# Patient Record
Sex: FEMALE | Race: WHITE | NOT HISPANIC OR LATINO | Employment: FULL TIME | URBAN - METROPOLITAN AREA
[De-identification: names, ages, dates, MRNs, and addresses within clinical notes are randomized per-mention and may not be internally consistent; named-entity substitution may affect disease eponyms.]

---

## 2019-06-17 LAB
D AG BLD QL: POSITIVE
EXTERNAL ABO: NORMAL
EXTERNAL ANTIBODY SCREEN: NORMAL
HBV SURFACE AG SER QL: NONREACTIVE
HIV 1+2 AB+HIV1 P24 AG SERPL QL IA: NONREACTIVE
RUBELLA IGG SCREEN: NORMAL
T PALLIDUM AB SER QL IF: NONREACTIVE

## 2019-12-11 LAB — GP B STREP SPEC QL CULT: NORMAL

## 2020-01-17 ENCOUNTER — ANESTHESIA (INPATIENT)
Dept: OBSTETRICS AND GYNECOLOGY | Facility: HOSPITAL | Age: 25
End: 2020-01-17
Payer: COMMERCIAL

## 2020-01-17 ENCOUNTER — ANESTHESIA EVENT (INPATIENT)
Dept: OBSTETRICS AND GYNECOLOGY | Facility: HOSPITAL | Age: 25
End: 2020-01-17
Payer: COMMERCIAL

## 2020-01-17 ENCOUNTER — HOSPITAL ENCOUNTER (INPATIENT)
Facility: HOSPITAL | Age: 25
LOS: 2 days | Discharge: HOME | End: 2020-01-19
Attending: OBSTETRICS & GYNECOLOGY | Admitting: OBSTETRICS & GYNECOLOGY
Payer: COMMERCIAL

## 2020-01-17 PROBLEM — Z34.90 ENCOUNTER FOR PLANNED INDUCTION OF LABOR: Status: ACTIVE | Noted: 2020-01-17

## 2020-01-17 PROBLEM — Z34.90 ENCOUNTER FOR PLANNED INDUCTION OF LABOR: Status: RESOLVED | Noted: 2020-01-17 | Resolved: 2020-01-17

## 2020-01-17 LAB
ABO + RH BLD: NORMAL
BLD GP AB SCN SERPL QL: NEGATIVE
D AG BLD QL: POSITIVE
ERYTHROCYTE [DISTWIDTH] IN BLOOD BY AUTOMATED COUNT: 12.9 % (ref 11.7–14.4)
HCT VFR BLDCO AUTO: 39.5 %
HGB BLD-MCNC: 13 G/DL (ref 11.8–15.7)
LABORATORY COMMENT REPORT: NORMAL
MCH RBC QN AUTO: 29.6 PG (ref 28–33.2)
MCHC RBC AUTO-ENTMCNC: 32.9 G/DL (ref 32.2–35.5)
MCV RBC AUTO: 90 FL (ref 83–98)
PDW BLD AUTO: 11.6 FL (ref 9.4–12.3)
PLATELET # BLD AUTO: 177 K/UL
RBC # BLD AUTO: 4.39 M/UL (ref 3.93–5.22)
T PALLIDUM AB SER QL IF: NONREACTIVE
WBC # BLD AUTO: 13.28 K/UL

## 2020-01-17 PROCEDURE — 86901 BLOOD TYPING SEROLOGIC RH(D): CPT

## 2020-01-17 PROCEDURE — 36415 COLL VENOUS BLD VENIPUNCTURE: CPT | Performed by: OBSTETRICS & GYNECOLOGY

## 2020-01-17 PROCEDURE — 63700000 HC SELF-ADMINISTRABLE DRUG: Performed by: OBSTETRICS & GYNECOLOGY

## 2020-01-17 PROCEDURE — 0KQM0ZZ REPAIR PERINEUM MUSCLE, OPEN APPROACH: ICD-10-PCS | Performed by: OBSTETRICS & GYNECOLOGY

## 2020-01-17 PROCEDURE — 27200130 HC EPIDURAL ANES TRAY

## 2020-01-17 PROCEDURE — 12000000 HC ROOM AND CARE MED/SURG

## 2020-01-17 PROCEDURE — 72000011 HC VAGINAL DELIVERY LEVEL 1

## 2020-01-17 PROCEDURE — 25000000 HC PHARMACY GENERAL: Performed by: OBSTETRICS & GYNECOLOGY

## 2020-01-17 PROCEDURE — 37000005 HC ANESTHESIA EPIDURAL/SPINAL

## 2020-01-17 PROCEDURE — 25000000 HC PHARMACY GENERAL: Performed by: ANESTHESIOLOGY

## 2020-01-17 PROCEDURE — 85027 COMPLETE CBC AUTOMATED: CPT | Performed by: OBSTETRICS & GYNECOLOGY

## 2020-01-17 PROCEDURE — 86780 TREPONEMA PALLIDUM: CPT | Performed by: OBSTETRICS & GYNECOLOGY

## 2020-01-17 PROCEDURE — 63600000 HC DRUGS/DETAIL CODE: Performed by: OBSTETRICS & GYNECOLOGY

## 2020-01-17 RX ORDER — EPHEDRINE SULFATE/0.9% NACL/PF 50 MG/5 ML
10 SYRINGE (ML) INTRAVENOUS AS NEEDED
Status: DISCONTINUED | OUTPATIENT
Start: 2020-01-17 | End: 2020-01-17

## 2020-01-17 RX ORDER — METHYLERGONOVINE MALEATE 0.2 MG/ML
200 INJECTION INTRAVENOUS ONCE AS NEEDED
Status: DISCONTINUED | OUTPATIENT
Start: 2020-01-17 | End: 2020-01-19 | Stop reason: HOSPADM

## 2020-01-17 RX ORDER — OXYTOCIN/0.9 % SODIUM CHLORIDE 20/1000 ML
500 PLASTIC BAG, INJECTION (ML) INTRAVENOUS ONCE
Status: COMPLETED | OUTPATIENT
Start: 2020-01-17 | End: 2020-01-17

## 2020-01-17 RX ORDER — BUPIVACAINE HYDROCHLORIDE 2.5 MG/ML
INJECTION, SOLUTION EPIDURAL; INFILTRATION; INTRACAUDAL AS NEEDED
Status: DISCONTINUED | OUTPATIENT
Start: 2020-01-17 | End: 2020-01-17 | Stop reason: SURG

## 2020-01-17 RX ORDER — CALCIUM CARBONATE 200(500)MG
500 TABLET,CHEWABLE ORAL EVERY 4 HOURS PRN
Status: DISCONTINUED | OUTPATIENT
Start: 2020-01-17 | End: 2020-01-19 | Stop reason: HOSPADM

## 2020-01-17 RX ORDER — OXYTOCIN/0.9 % SODIUM CHLORIDE 30/500 ML
PLASTIC BAG, INJECTION (ML) INTRAVENOUS
Status: DISPENSED
Start: 2020-01-17 | End: 2020-01-17

## 2020-01-17 RX ORDER — OXYCODONE HYDROCHLORIDE 5 MG/1
5-10 TABLET ORAL EVERY 4 HOURS PRN
Status: DISCONTINUED | OUTPATIENT
Start: 2020-01-17 | End: 2020-01-19 | Stop reason: HOSPADM

## 2020-01-17 RX ORDER — LIDOCAINE HYDROCHLORIDE AND EPINEPHRINE 15; 5 MG/ML; UG/ML
INJECTION, SOLUTION EPIDURAL AS NEEDED
Status: DISCONTINUED | OUTPATIENT
Start: 2020-01-17 | End: 2020-01-17 | Stop reason: SURG

## 2020-01-17 RX ORDER — ALUMINUM HYDROXIDE, MAGNESIUM HYDROXIDE, AND SIMETHICONE 1200; 120; 1200 MG/30ML; MG/30ML; MG/30ML
30 SUSPENSION ORAL EVERY 4 HOURS PRN
Status: DISCONTINUED | OUTPATIENT
Start: 2020-01-17 | End: 2020-01-19 | Stop reason: HOSPADM

## 2020-01-17 RX ORDER — OXYTOCIN/0.9 % SODIUM CHLORIDE 20/1000 ML
PLASTIC BAG, INJECTION (ML) INTRAVENOUS
Status: DISPENSED
Start: 2020-01-17 | End: 2020-01-17

## 2020-01-17 RX ORDER — OXYTOCIN/0.9 % SODIUM CHLORIDE 20/1000 ML
PLASTIC BAG, INJECTION (ML) INTRAVENOUS CONTINUOUS
Status: DISCONTINUED | OUTPATIENT
Start: 2020-01-17 | End: 2020-01-17

## 2020-01-17 RX ORDER — IBUPROFEN 600 MG/1
600 TABLET ORAL EVERY 6 HOURS PRN
Status: DISCONTINUED | OUTPATIENT
Start: 2020-01-17 | End: 2020-01-19 | Stop reason: HOSPADM

## 2020-01-17 RX ORDER — METOCLOPRAMIDE HYDROCHLORIDE 5 MG/ML
10 INJECTION INTRAMUSCULAR; INTRAVENOUS EVERY 6 HOURS PRN
Status: DISCONTINUED | OUTPATIENT
Start: 2020-01-17 | End: 2020-01-19 | Stop reason: HOSPADM

## 2020-01-17 RX ORDER — AMOXICILLIN 250 MG
1 CAPSULE ORAL 2 TIMES DAILY
Status: DISCONTINUED | OUTPATIENT
Start: 2020-01-17 | End: 2020-01-19 | Stop reason: HOSPADM

## 2020-01-17 RX ORDER — SODIUM CHLORIDE, SODIUM LACTATE, POTASSIUM CHLORIDE, CALCIUM CHLORIDE 600; 310; 30; 20 MG/100ML; MG/100ML; MG/100ML; MG/100ML
125 INJECTION, SOLUTION INTRAVENOUS CONTINUOUS
Status: DISCONTINUED | OUTPATIENT
Start: 2020-01-17 | End: 2020-01-17

## 2020-01-17 RX ORDER — ACETAMINOPHEN 325 MG/1
650 TABLET ORAL EVERY 4 HOURS
Status: DISCONTINUED | OUTPATIENT
Start: 2020-01-17 | End: 2020-01-19 | Stop reason: HOSPADM

## 2020-01-17 RX ORDER — CARBOPROST TROMETHAMINE 250 UG/ML
250 INJECTION, SOLUTION INTRAMUSCULAR ONCE AS NEEDED
Status: DISCONTINUED | OUTPATIENT
Start: 2020-01-17 | End: 2020-01-19 | Stop reason: HOSPADM

## 2020-01-17 RX ORDER — DIPHENHYDRAMINE HCL 50 MG/ML
25 VIAL (ML) INJECTION EVERY 6 HOURS PRN
Status: DISCONTINUED | OUTPATIENT
Start: 2020-01-17 | End: 2020-01-19 | Stop reason: HOSPADM

## 2020-01-17 RX ORDER — ACETAMINOPHEN 325 MG/1
650 TABLET ORAL EVERY 4 HOURS PRN
Status: DISCONTINUED | OUTPATIENT
Start: 2020-01-17 | End: 2020-01-17 | Stop reason: SDUPTHER

## 2020-01-17 RX ORDER — TRANEXAMIC ACID 10 MG/ML
1000 INJECTION, SOLUTION INTRAVENOUS ONCE AS NEEDED
Status: DISCONTINUED | OUTPATIENT
Start: 2020-01-17 | End: 2020-01-19 | Stop reason: HOSPADM

## 2020-01-17 RX ORDER — MISOPROSTOL 200 UG/1
1000 TABLET ORAL ONCE AS NEEDED
Status: DISCONTINUED | OUTPATIENT
Start: 2020-01-17 | End: 2020-01-19 | Stop reason: HOSPADM

## 2020-01-17 RX ORDER — METOCLOPRAMIDE 10 MG/1
10 TABLET ORAL EVERY 6 HOURS PRN
Status: DISCONTINUED | OUTPATIENT
Start: 2020-01-17 | End: 2020-01-19 | Stop reason: HOSPADM

## 2020-01-17 RX ORDER — PNV,CALCIUM 72/IRON/FOLIC ACID 27 MG-1 MG
TABLET ORAL
COMMUNITY
Start: 2019-12-21 | End: 2020-01-19 | Stop reason: HOSPADM

## 2020-01-17 RX ORDER — OXYTOCIN 10 [USP'U]/ML
10 INJECTION, SOLUTION INTRAMUSCULAR; INTRAVENOUS ONCE AS NEEDED
Status: DISCONTINUED | OUTPATIENT
Start: 2020-01-17 | End: 2020-01-19 | Stop reason: HOSPADM

## 2020-01-17 RX ORDER — DIPHENHYDRAMINE HCL 25 MG
25 CAPSULE ORAL EVERY 6 HOURS PRN
Status: DISCONTINUED | OUTPATIENT
Start: 2020-01-17 | End: 2020-01-19 | Stop reason: HOSPADM

## 2020-01-17 RX ORDER — DIBUCAINE 1 %
1 OINTMENT (GRAM) TOPICAL AS NEEDED
Status: DISCONTINUED | OUTPATIENT
Start: 2020-01-17 | End: 2020-01-19 | Stop reason: HOSPADM

## 2020-01-17 RX ORDER — OXYTOCIN/0.9 % SODIUM CHLORIDE 30/500 ML
0-20 PLASTIC BAG, INJECTION (ML) INTRAVENOUS CONTINUOUS
Status: DISCONTINUED | OUTPATIENT
Start: 2020-01-17 | End: 2020-01-17

## 2020-01-17 RX ADMIN — Medication 50 ML: at 15:08

## 2020-01-17 RX ADMIN — Medication 10 MG: at 14:24

## 2020-01-17 RX ADMIN — Medication 50 ML: at 11:18

## 2020-01-17 RX ADMIN — IBUPROFEN 600 MG: 600 TABLET ORAL at 20:12

## 2020-01-17 RX ADMIN — BUPIVACAINE HYDROCHLORIDE 3 ML: 2.5 INJECTION, SOLUTION EPIDURAL; INFILTRATION; INTRACAUDAL at 11:35

## 2020-01-17 RX ADMIN — SODIUM CHLORIDE, POTASSIUM CHLORIDE, SODIUM LACTATE AND CALCIUM CHLORIDE 1000 ML: 600; 310; 30; 20 INJECTION, SOLUTION INTRAVENOUS at 08:10

## 2020-01-17 RX ADMIN — SENNOSIDES AND DOCUSATE SODIUM 1 TABLET: 8.6; 5 TABLET ORAL at 20:13

## 2020-01-17 RX ADMIN — OXYTOCIN-SODIUM CHLORIDE 0.9% IV SOLN 30 UNIT/500ML 2 MILLI-UNITS/MIN: 30-0.9/5 SOLUTION at 08:54

## 2020-01-17 RX ADMIN — Medication: at 16:42

## 2020-01-17 RX ADMIN — SODIUM CHLORIDE, POTASSIUM CHLORIDE, SODIUM LACTATE AND CALCIUM CHLORIDE 125 ML/HR: 600; 310; 30; 20 INJECTION, SOLUTION INTRAVENOUS at 11:15

## 2020-01-17 RX ADMIN — Medication 10 ML/HR: at 11:35

## 2020-01-17 RX ADMIN — LIDOCAINE HYDROCHLORIDE,EPINEPHRINE BITARTRATE 3 ML: 15; .005 INJECTION, SOLUTION EPIDURAL; INFILTRATION; INTRACAUDAL; PERINEURAL at 11:34

## 2020-01-17 SDOH — ECONOMIC STABILITY: FOOD INSECURITY: WITHIN THE PAST 12 MONTHS, YOU WORRIED THAT YOUR FOOD WOULD RUN OUT BEFORE YOU GOT THE MONEY TO BUY MORE.: NEVER TRUE

## 2020-01-17 SDOH — ECONOMIC STABILITY: FOOD INSECURITY: WITHIN THE PAST 12 MONTHS, THE FOOD YOU BOUGHT JUST DIDN'T LAST AND YOU DIDN'T HAVE MONEY TO GET MORE.: NEVER TRUE

## 2020-01-17 SDOH — HEALTH STABILITY: MENTAL HEALTH: HOW OFTEN DO YOU HAVE A DRINK CONTAINING ALCOHOL?: NEVER

## 2020-01-17 SDOH — ECONOMIC STABILITY: TRANSPORTATION INSECURITY: IN THE PAST 12 MONTHS, HAS LACK OF TRANSPORTATION KEPT YOU FROM MEDICAL APPOINTMENTS OR FROM GETTING MEDICATIONS?: NO

## 2020-01-17 SDOH — ECONOMIC STABILITY: FOOD INSECURITY: HOW HARD IS IT FOR YOU TO PAY FOR THE VERY BASICS LIKE FOOD, HOUSING, MEDICAL CARE, AND HEATING?: NOT HARD AT ALL

## 2020-01-17 ASSESSMENT — ACTIVITIES OF DAILY LIVING (ADL): LACK_OF_TRANSPORTATION: NO

## 2020-01-17 NOTE — ANESTHESIOLOGIST PRE-PROCEDURE ATTESTATION
Pre-Procedure Patient Identification:  I am the Primary Anesthesiologist and have identified the patient on 01/17/20 at 11:38 AM.   I have confirmed the following procedure(s) * No procedures listed * will be performed by the following surgeon/proceduralist * No surgeons listed *.

## 2020-01-17 NOTE — ANESTHESIA PROCEDURE NOTES
Epidural Block    Patient location during procedure: OB  Start time: 1/17/2020 11:25 AM  End time: 1/17/2020 11:35 AM  Reason for block: labor analgesia requested by patient and obstetrician  Staffing  Anesthesiologist: Rosalva Chisholm MD  Performed: anesthesiologist   Preanesthetic Checklist  Completed: patient identified, surgical consent, pre-op evaluation, timeout performed, IV checked, risks and benefits discussed, monitors and equipment checked and sterile field maintained during procedure  Epidural  Patient position: sitting  Prep: ChloraPrep and site prepped and draped  Patient monitoring: heart rate, cardiac monitor, continuous pulse ox and blood pressure  Approach: midline  Location: lumbar (1-5)  Injection technique: COURTNEY saline  Needle  Needle type: Aliosn   Needle gauge: 17 G  Needle length: 3.5 in  Needle insertion depth: 4.5 cm  Catheter type: Single orifice  Catheter size: 19 G  Catheter at skin depth: 10 cm  Test dose: negative and lidocaine 1.5% with epinephrine 1-to-200,000  Additional Notes  Procedure well tolerated. Vital signs stable. No paresthesia.  Analgesia satisfactory. Patients nurse to notify anesthesiologist if hemodynamically unstable.

## 2020-01-17 NOTE — ANESTHESIA PREPROCEDURE EVALUATION
"        ROS/Med Hx    Relevant Problems   No relevant active problems   borderline von willebrands --had ddavp for tonsils when \"levels were low\"  No bleeding diatheses during pregnancy  Obesity     Physical Exam    Airway   Mallampati: III  Cardiovascular - normal   Rhythm: regular   Rate: normalPulmonary - normal   clear to auscultation        Anesthesia Plan    Plan: labor epidural   ASA 2 - emergent  Anesthetic plan and risks discussed with: patient    "

## 2020-01-17 NOTE — PROGRESS NOTES
Pt is comfortable with epidural  Cervix 4/100/-2  FHR - cat. 1   Pit at 8  toco Q 2-3mins  Increase pitocin 1 mU/min Q 30 mins

## 2020-01-17 NOTE — H&P
HPI     Karen Sprague is a 24 y.o. female  at 41w0d with an estimated due date of 1/10/2020, by Patient Reported who presents with contractions overnight that have decreased in the last 2 hours.  Pt was to be induced today anyway.  Intact.      OB History:   OB History    Para Term  AB Living   1 0 0 0 0 0   SAB TAB Ectopic Multiple Live Births   0 0 0 0 0      # Outcome Date GA Lbr Jose De Jesus/2nd Weight Sex Delivery Anes PTL Lv   1 Current                Medical History:   Past Medical History:   Diagnosis Date   • Abnormal Pap smear of cervix    • Blood disorder    • Von Willebrands disease (CMS/HCC)        Surgical History:   Past Surgical History:   Procedure Laterality Date   • FOOT SURGERY     • TONSILLECTOMY     • WISDOM TOOTH EXTRACTION         Social History:   Social History     Socioeconomic History   • Marital status: Single     Spouse name: Jcarlos   • Number of children: None   • Years of education: None   • Highest education level: None   Occupational History   • Occupation: [employer]   Social Needs   • Financial resource strain: Not hard at all   • Food insecurity:     Worry: Never true     Inability: Never true   • Transportation needs:     Medical: No     Non-medical: No   Tobacco Use   • Smoking status: Never Smoker   • Smokeless tobacco: Never Used   Substance and Sexual Activity   • Alcohol use: Never     Frequency: Never   • Drug use: Never   • Sexual activity: Yes     Partners: Male   Lifestyle   • Physical activity:     Days per week: None     Minutes per session: None   • Stress: None   Relationships   • Social connections:     Talks on phone: None     Gets together: None     Attends Synagogue service: None     Active member of club or organization: None     Attends meetings of clubs or organizations: None     Relationship status: None   • Intimate partner violence:     Fear of current or ex partner: None     Emotionally abused: None     Physically abused: None     Forced  sexual activity: None   Other Topics Concern   • None   Social History Narrative   • None        Family History:   Family History   Problem Relation Age of Onset   • Stroke Maternal Grandmother    • Cancer Maternal Grandfather    • Cancer Biological Mother    • Hypertension Biological Mother        Allergies: Patient has no known allergies.    Prior to Admission medications    Medication Sig Start Date End Date Taking? Authorizing Provider   PREPLUS 27 mg iron- 1 mg tablet TK 1 T PO QD 19  Yes Provider, Dayana, MD       Review of Systems  Pertinent items are noted in HPI.    Objective     Vital Signs for the last 24 hours:  Heart Rate:  [110] 110  BP: (114)/(74) 114/74    Latest cervical exam:  Cervical Dilation (cm): 3  Cervical Effacement: 90     Method: sterile exam per physician (20 0825)      Fetal Monitoring:  FHR Baseline: 130  FHR Variability: mod  FHR Accelerations: yes  FHR Decelerations: no    Contraction Frequency: Q4-5 mins    Exam:  General Appearance: Alert, cooperative, no acute distress  Lungs:  respirations unlabored  Heart: Regular rate   Abdomen: gravid, nontender  Genitalia: See vaginal exam  Extremities: no edema or calf tenderness  Neurologic: grossly intact without focal deficits        Labs:  No results found for: ABO, LABRH, RUBELLAIGGQT, GBS-pending    Assessment/Plan     Karen Sprague is a 24 y.o. female  at 41w0d admitted for induction of labor     FHR: Category I  GBS: negative   AROM - clear    Paulette Mascorro MD

## 2020-01-18 PROCEDURE — 63700000 HC SELF-ADMINISTRABLE DRUG: Performed by: OBSTETRICS & GYNECOLOGY

## 2020-01-18 PROCEDURE — 12000000 HC ROOM AND CARE MED/SURG

## 2020-01-18 RX ADMIN — ACETAMINOPHEN 650 MG: 325 TABLET, FILM COATED ORAL at 05:23

## 2020-01-18 RX ADMIN — SENNOSIDES AND DOCUSATE SODIUM 1 TABLET: 8.6; 5 TABLET ORAL at 20:59

## 2020-01-18 RX ADMIN — IBUPROFEN 600 MG: 600 TABLET ORAL at 16:22

## 2020-01-18 RX ADMIN — ACETAMINOPHEN 650 MG: 325 TABLET, FILM COATED ORAL at 09:42

## 2020-01-18 RX ADMIN — PRENATAL VITAMINS-IRON FUMARATE 27 MG IRON-FOLIC ACID 0.8 MG TABLET 1 TABLET: at 09:42

## 2020-01-18 RX ADMIN — SENNOSIDES AND DOCUSATE SODIUM 1 TABLET: 8.6; 5 TABLET ORAL at 09:42

## 2020-01-18 RX ADMIN — IBUPROFEN 600 MG: 600 TABLET ORAL at 09:42

## 2020-01-18 RX ADMIN — ACETAMINOPHEN 650 MG: 325 TABLET, FILM COATED ORAL at 13:23

## 2020-01-18 RX ADMIN — IBUPROFEN 600 MG: 600 TABLET ORAL at 02:09

## 2020-01-18 RX ADMIN — IBUPROFEN 600 MG: 600 TABLET ORAL at 23:11

## 2020-01-18 NOTE — PLAN OF CARE
Problem: Adult Inpatient Plan of Care  Goal: Plan of Care Review  Outcome: Progressing  Flowsheets (Taken 1/18/2020 6949)  Progress: improving  Plan of Care Reviewed With: patient; spouse  Outcome Summary: pt states pain well-controlled w/ PO meds. bottlefeeding exclusively - wearing bra. assuming total care of infant.

## 2020-01-18 NOTE — PLAN OF CARE
Problem: Adult Inpatient Plan of Care  Goal: Plan of Care Review  1/18/2020 0531 by Margret Armando, RN  Outcome: Progressing  Flowsheets  Taken 1/18/2020 0531  Progress: improving  Outcome Summary: Pt recovering well from first vaginal delivery. Pt taking tylenol and ibuprofen for pain. Pt very independent with her self care.  Taken 1/18/2020 0000  Plan of Care Reviewed With: patient;significant other

## 2020-01-18 NOTE — NURSING NOTE
Pt getting in shower now. Offers no complaints/needs. States pain well-controlled at this time. RN to return in 30 minutes for a.m. Assessment and med administration. Pt v/u; agreeable with plan of care. Infant asleep in open crib.    Griselda Eli, RN

## 2020-01-18 NOTE — PLAN OF CARE
Problem: Adult Inpatient Plan of Care  Goal: Patient-Specific Goal (Individualization)  Outcome: Progressing  Flowsheets (Taken 1/18/2020 0528)  Individualized Care Needs: bottle feeding instructions welcome.  Anxieties, Fears or Concerns: first time parents. they are very concerned with caring for their new baby. They appreciate all teaching.

## 2020-01-18 NOTE — NURSING NOTE
Pt and SO sleeping, undisturbed by RN activity at bedside. Assessment deferred while pt rests. Infant asleep in open crib at bedside.    Girselda Eli RN

## 2020-01-18 NOTE — PROGRESS NOTES
Obstetrics Postpartum Progress Note    Events  No acute events overnight.    Subjective  Pain: Controlled with medications  Bleeding: lochia moderate  Voiding: without difficulty  Ambulating: as tolerated    Vitals  Temp:  [36.4 °C (97.5 °F)-37 °C (98.6 °F)] 36.4 °C (97.5 °F)  Heart Rate:  [] 82  Resp:  [14-18] 14  BP: ()/(50-91) 110/57    I&O    Intake/Output Summary (Last 24 hours) at 2020 0944  Last data filed at 2020 1948  Gross per 24 hour   Intake 1000 ml   Output 1260 ml   Net -260 ml       Physical Exam  General: well, NAD  Abdomen: soft, NT  Fundus: firm and nontender  Extremities: NT x2, symmetric    Labs  Labs Reviewed:  Lab Results   Component Value Date    ABO B 2020    LABRH Positive 2020          Assessment/Plan   Problem-based Assessment and Plan    Karen Sprague is a 24 y.o.  postpartum day 1 s/p Vaginal, Spontaneous .      Appropriate postpartum course. Continue routine postpartum care.  Anticipate discharge on PPD#2      Jany Barry MD

## 2020-01-18 NOTE — ANESTHESIA POSTPROCEDURE EVALUATION
Patient: Karen Sprague    Procedure Summary     Date:  01/17/20 Room / Location:      Anesthesia Start:  1125 Anesthesia Stop:  1624    Procedure:  Labor Analgesia Diagnosis:      Scheduled Providers:   Responsible Provider:  Jcarlos Littlejohn MD    Anesthesia Type:  labor epidural ASA Status:  2 - Emergent          Anesthesia Type: labor epidural  PACU Vitals     No data found in the last 10 encounters.            Anesthesia Post Evaluation    Pain management: adequate  Patient participation: complete - patient participated  Level of consciousness: awake and alert  Cardiovascular status: acceptable  Airway Patency: adequate  Respiratory status: acceptable  Hydration status: acceptable  Pain well controlled after spinal preservative free morphine administration: Yes  Continue 24 hours observation after preservative free morphine administration: No  Anesthetic complications: no

## 2020-01-19 VITALS
WEIGHT: 192 LBS | TEMPERATURE: 97.5 F | RESPIRATION RATE: 18 BRPM | HEIGHT: 66 IN | SYSTOLIC BLOOD PRESSURE: 142 MMHG | HEART RATE: 82 BPM | DIASTOLIC BLOOD PRESSURE: 81 MMHG | OXYGEN SATURATION: 99 % | BODY MASS INDEX: 30.86 KG/M2

## 2020-01-19 PROCEDURE — 63700000 HC SELF-ADMINISTRABLE DRUG: Performed by: OBSTETRICS & GYNECOLOGY

## 2020-01-19 RX ADMIN — SENNOSIDES AND DOCUSATE SODIUM 1 TABLET: 8.6; 5 TABLET ORAL at 09:08

## 2020-01-19 RX ADMIN — PRENATAL VITAMINS-IRON FUMARATE 27 MG IRON-FOLIC ACID 0.8 MG TABLET 1 TABLET: at 09:08

## 2020-01-19 RX ADMIN — IBUPROFEN 600 MG: 600 TABLET ORAL at 05:24

## 2020-01-19 NOTE — PLAN OF CARE
Problem: Adult Inpatient Plan of Care  Goal: Plan of Care Review  Outcome: Progressing  Flowsheets (Taken 1/19/2020 0535)  Progress: improving  Plan of Care Reviewed With: patient; significant other  Outcome Summary: pain controlled, bottle feeding-bra on, bleeding controlled, VSS

## 2021-02-12 ENCOUNTER — APPOINTMENT (RX ONLY)
Dept: URBAN - METROPOLITAN AREA CLINIC 374 | Facility: CLINIC | Age: 26
Setting detail: DERMATOLOGY
End: 2021-02-12

## 2021-02-12 DIAGNOSIS — D18.0 HEMANGIOMA: ICD-10-CM

## 2021-02-12 DIAGNOSIS — L91.8 OTHER HYPERTROPHIC DISORDERS OF THE SKIN: ICD-10-CM

## 2021-02-12 DIAGNOSIS — D22 MELANOCYTIC NEVI: ICD-10-CM

## 2021-02-12 DIAGNOSIS — Z71.89 OTHER SPECIFIED COUNSELING: ICD-10-CM

## 2021-02-12 DIAGNOSIS — L81.4 OTHER MELANIN HYPERPIGMENTATION: ICD-10-CM

## 2021-02-12 DIAGNOSIS — L70.0 ACNE VULGARIS: ICD-10-CM

## 2021-02-12 PROBLEM — D22.71 MELANOCYTIC NEVI OF RIGHT LOWER LIMB, INCLUDING HIP: Status: ACTIVE | Noted: 2021-02-12

## 2021-02-12 PROBLEM — D22.61 MELANOCYTIC NEVI OF RIGHT UPPER LIMB, INCLUDING SHOULDER: Status: ACTIVE | Noted: 2021-02-12

## 2021-02-12 PROBLEM — D22.72 MELANOCYTIC NEVI OF LEFT LOWER LIMB, INCLUDING HIP: Status: ACTIVE | Noted: 2021-02-12

## 2021-02-12 PROBLEM — D22.62 MELANOCYTIC NEVI OF LEFT UPPER LIMB, INCLUDING SHOULDER: Status: ACTIVE | Noted: 2021-02-12

## 2021-02-12 PROBLEM — D18.01 HEMANGIOMA OF SKIN AND SUBCUTANEOUS TISSUE: Status: ACTIVE | Noted: 2021-02-12

## 2021-02-12 PROBLEM — D22.5 MELANOCYTIC NEVI OF TRUNK: Status: ACTIVE | Noted: 2021-02-12

## 2021-02-12 PROCEDURE — ? FULL BODY SKIN EXAM

## 2021-02-12 PROCEDURE — ? PRESCRIPTION

## 2021-02-12 PROCEDURE — ? BENIGN DESTRUCTION

## 2021-02-12 PROCEDURE — ? COUNSELING

## 2021-02-12 PROCEDURE — 99203 OFFICE O/P NEW LOW 30 MIN: CPT

## 2021-02-12 PROCEDURE — ? SUNSCREEN RECOMMENDATIONS

## 2021-02-12 PROCEDURE — ? TOPICAL RETINOID COUNSELING

## 2021-02-12 PROCEDURE — ? PRESCRIPTION MEDICATION MANAGEMENT

## 2021-02-12 RX ORDER — ADAPALENE 3 MG/G
GEL TOPICAL QHS
Qty: 1 | Refills: 3 | Status: ERX | COMMUNITY
Start: 2021-02-12

## 2021-02-12 RX ADMIN — ADAPALENE: 3 GEL TOPICAL at 00:00

## 2021-02-12 ASSESSMENT — LOCATION SIMPLE DESCRIPTION DERM
LOCATION SIMPLE: RIGHT UPPER BACK
LOCATION SIMPLE: RIGHT ANTERIOR NECK
LOCATION SIMPLE: LEFT PRETIBIAL REGION
LOCATION SIMPLE: UPPER BACK
LOCATION SIMPLE: LEFT CHEEK
LOCATION SIMPLE: LEFT POSTERIOR UPPER ARM
LOCATION SIMPLE: ABDOMEN
LOCATION SIMPLE: RIGHT POSTERIOR UPPER ARM
LOCATION SIMPLE: RIGHT PRETIBIAL REGION

## 2021-02-12 ASSESSMENT — LOCATION DETAILED DESCRIPTION DERM
LOCATION DETAILED: RIGHT SUPERIOR MEDIAL UPPER BACK
LOCATION DETAILED: EPIGASTRIC SKIN
LOCATION DETAILED: RIGHT PROXIMAL PRETIBIAL REGION
LOCATION DETAILED: LEFT INFERIOR MEDIAL MALAR CHEEK
LOCATION DETAILED: RIGHT INFERIOR ANTERIOR NECK
LOCATION DETAILED: LEFT PROXIMAL POSTERIOR UPPER ARM
LOCATION DETAILED: RIGHT PROXIMAL POSTERIOR UPPER ARM
LOCATION DETAILED: SUPERIOR THORACIC SPINE
LOCATION DETAILED: LEFT PROXIMAL PRETIBIAL REGION

## 2021-02-12 ASSESSMENT — LOCATION ZONE DERM
LOCATION ZONE: NECK
LOCATION ZONE: FACE
LOCATION ZONE: LEG
LOCATION ZONE: ARM
LOCATION ZONE: TRUNK

## 2021-02-12 NOTE — PROCEDURE: PRESCRIPTION MEDICATION MANAGEMENT
Render In Strict Bullet Format?: No
Detail Level: Zone
Initiate Treatment: adapalene 0.3 % topical gel Qhs: Apply a pea size amount qhs to face for acne

## 2021-02-12 NOTE — PROCEDURE: COUNSELING
Detail Level: Detailed
Topical Sulfur Applications Counseling: Topical Sulfur Counseling: Patient counseled that this medication may cause skin irritation or allergic reactions.  In the event of skin irritation, the patient was advised to reduce the amount of the drug applied or use it less frequently.   The patient verbalized understanding of the proper use and possible adverse effects of topical sulfur application.  All of the patient's questions and concerns were addressed.
Erythromycin Pregnancy And Lactation Text: This medication is Pregnancy Category B and is considered safe during pregnancy. It is also excreted in breast milk.
Tazorac Counseling:  Patient advised that medication is irritating and drying.  Patient may need to apply sparingly and wash off after an hour before eventually leaving it on overnight.  The patient verbalized understanding of the proper use and possible adverse effects of tazorac.  All of the patient's questions and concerns were addressed.
Doxycycline Counseling:  Patient counseled regarding possible photosensitivity and increased risk for sunburn.  Patient instructed to avoid sunlight, if possible.  When exposed to sunlight, patients should wear protective clothing, sunglasses, and sunscreen.  The patient was instructed to call the office immediately if the following severe adverse effects occur:  hearing changes, easy bruising/bleeding, severe headache, or vision changes.  The patient verbalized understanding of the proper use and possible adverse effects of doxycycline.  All of the patient's questions and concerns were addressed.
Azithromycin Pregnancy And Lactation Text: This medication is considered safe during pregnancy and is also secreted in breast milk.
Sarecycline Counseling: Patient advised regarding possible photosensitivity and discoloration of the teeth, skin, lips, tongue and gums.  Patient instructed to avoid sunlight, if possible.  When exposed to sunlight, patients should wear protective clothing, sunglasses, and sunscreen.  The patient was instructed to call the office immediately if the following severe adverse effects occur:  hearing changes, easy bruising/bleeding, severe headache, or vision changes.  The patient verbalized understanding of the proper use and possible adverse effects of sarecycline.  All of the patient's questions and concerns were addressed.
Benzoyl Peroxide Pregnancy And Lactation Text: This medication is Pregnancy Category C. It is unknown if benzoyl peroxide is excreted in breast milk.
Topical Retinoid counseling:  Patient advised to apply a pea-sized amount only at bedtime and wait 30 minutes after washing their face before applying.  If too drying, patient may add a non-comedogenic moisturizer. The patient verbalized understanding of the proper use and possible adverse effects of retinoids.  All of the patient's questions and concerns were addressed.
Isotretinoin Counseling: Patient should get monthly blood tests, not donate blood, not drive at night if vision affected, not share medication, and not undergo elective surgery for 6 months after tx completed. Side effects reviewed, pt to contact office should one occur.
Topical Sulfur Applications Pregnancy And Lactation Text: This medication is Pregnancy Category C and has an unknown safety profile during pregnancy. It is unknown if this topical medication is excreted in breast milk.
Birth Control Pills Pregnancy And Lactation Text: This medication should be avoided if pregnant and for the first 30 days post-partum.
High Dose Vitamin A Pregnancy And Lactation Text: High dose vitamin A therapy is contraindicated during pregnancy and breast feeding.
Bactrim Counseling:  I discussed with the patient the risks of sulfa antibiotics including but not limited to GI upset, allergic reaction, drug rash, diarrhea, dizziness, photosensitivity, and yeast infections.  Rarely, more serious reactions can occur including but not limited to aplastic anemia, agranulocytosis, methemoglobinemia, blood dyscrasias, liver or kidney failure, lung infiltrates or desquamative/blistering drug rashes.
Tetracycline Counseling: Patient counseled regarding possible photosensitivity and increased risk for sunburn.  Patient instructed to avoid sunlight, if possible.  When exposed to sunlight, patients should wear protective clothing, sunglasses, and sunscreen.  The patient was instructed to call the office immediately if the following severe adverse effects occur:  hearing changes, easy bruising/bleeding, severe headache, or vision changes.  The patient verbalized understanding of the proper use and possible adverse effects of tetracycline.  All of the patient's questions and concerns were addressed. Patient understands to avoid pregnancy while on therapy due to potential birth defects.
Use Enhanced Medication Counseling?: No
Tazorac Pregnancy And Lactation Text: This medication is not safe during pregnancy. It is unknown if this medication is excreted in breast milk.
Sarecycline Pregnancy And Lactation Text: This medication is Pregnancy Category D and not consider safe during pregnancy. It is also excreted in breast milk.
Dapsone Counseling: I discussed with the patient the risks of dapsone including but not limited to hemolytic anemia, agranulocytosis, rashes, methemoglobinemia, kidney failure, peripheral neuropathy, headaches, GI upset, and liver toxicity.  Patients who start dapsone require monitoring including baseline LFTs and weekly CBCs for the first month, then every month thereafter.  The patient verbalized understanding of the proper use and possible adverse effects of dapsone.  All of the patient's questions and concerns were addressed.
Spironolactone Counseling: Patient advised regarding risks of diarrhea, abdominal pain, hyperkalemia, birth defects (for female patients), liver toxicity and renal toxicity. The patient may need blood work to monitor liver and kidney function and potassium levels while on therapy. The patient verbalized understanding of the proper use and possible adverse effects of spironolactone.  All of the patient's questions and concerns were addressed.
Topical Retinoid Pregnancy And Lactation Text: This medication is Pregnancy Category C. It is unknown if this medication is excreted in breast milk.
Doxycycline Pregnancy And Lactation Text: This medication is Pregnancy Category D and not consider safe during pregnancy. It is also excreted in breast milk but is considered safe for shorter treatment courses.
Topical Clindamycin Counseling: Patient counseled that this medication may cause skin irritation or allergic reactions.  In the event of skin irritation, the patient was advised to reduce the amount of the drug applied or use it less frequently.   The patient verbalized understanding of the proper use and possible adverse effects of clindamycin.  All of the patient's questions and concerns were addressed.
Minocycline Counseling: Patient advised regarding possible photosensitivity and discoloration of the teeth, skin, lips, tongue and gums.  Patient instructed to avoid sunlight, if possible.  When exposed to sunlight, patients should wear protective clothing, sunglasses, and sunscreen.  The patient was instructed to call the office immediately if the following severe adverse effects occur:  hearing changes, easy bruising/bleeding, severe headache, or vision changes.  The patient verbalized understanding of the proper use and possible adverse effects of minocycline.  All of the patient's questions and concerns were addressed.
Isotretinoin Pregnancy And Lactation Text: This medication is Pregnancy Category X and is considered extremely dangerous during pregnancy. It is unknown if it is excreted in breast milk.
Erythromycin Counseling:  I discussed with the patient the risks of erythromycin including but not limited to GI upset, allergic reaction, drug rash, diarrhea, increase in liver enzymes, and yeast infections.
Bactrim Pregnancy And Lactation Text: This medication is Pregnancy Category D and is known to cause fetal risk.  It is also excreted in breast milk.
Spironolactone Pregnancy And Lactation Text: This medication can cause feminization of the male fetus and should be avoided during pregnancy. The active metabolite is also found in breast milk.
Birth Control Pills Counseling: Birth Control Pill Counseling: I discussed with the patient the potential side effects of OCPs including but not limited to increased risk of stroke, heart attack, thrombophlebitis, deep venous thrombosis, hepatic adenomas, breast changes, GI upset, headaches, and depression.  The patient verbalized understanding of the proper use and possible adverse effects of OCPs. All of the patient's questions and concerns were addressed.
Benzoyl Peroxide Counseling: Patient counseled that medicine may cause skin irritation and bleach clothing.  In the event of skin irritation, the patient was advised to reduce the amount of the drug applied or use it less frequently.   The patient verbalized understanding of the proper use and possible adverse effects of benzoyl peroxide.  All of the patient's questions and concerns were addressed.
High Dose Vitamin A Counseling: Side effects reviewed, pt to contact office should one occur.
Topical Clindamycin Pregnancy And Lactation Text: This medication is Pregnancy Category B and is considered safe during pregnancy. It is unknown if it is excreted in breast milk.
Dapsone Pregnancy And Lactation Text: This medication is Pregnancy Category C and is not considered safe during pregnancy or breast feeding.
Azithromycin Counseling:  I discussed with the patient the risks of azithromycin including but not limited to GI upset, allergic reaction, drug rash, diarrhea, and yeast infections.

## 2021-02-12 NOTE — PROCEDURE: BENIGN DESTRUCTION
Detail Level: Detailed
Add 52 Modifier (Optional): no
Consent: The patient's consent was obtained including but not limited to risks of crusting, scabbing, blistering, scarring, darker or lighter pigmentary change, recurrence, incomplete removal and infection.
Medical Necessity Clause: This procedure was medically necessary because the lesions that were treated were:
Post-Care Instructions: I reviewed with the patient in detail post-care instructions. Patient is to wear sunprotection, and avoid picking at any of the treated lesions. Pt may apply Vaseline to crusted or scabbing areas.
Medical Necessity Information: It is in your best interest to select a reason for this procedure from the list below. All of these items fulfill various CMS LCD requirements except the new and changing color options.
Anesthesia Volume In Cc: 0.5

## 2023-05-05 ENCOUNTER — TRANSCRIBE ORDERS (OUTPATIENT)
Dept: SCHEDULING | Age: 28
End: 2023-05-05

## 2023-05-05 DIAGNOSIS — Z36.87 ENCOUNTER FOR ANTENATAL SCREENING FOR UNCERTAIN DATES: Primary | ICD-10-CM

## 2023-05-05 LAB
HBV SURFACE AG SER QL: NONREACTIVE
HIV 1+2 AB+HIV1 P24 AG SERPL QL IA: NONREACTIVE

## 2023-05-10 ENCOUNTER — HOSPITAL ENCOUNTER (OUTPATIENT)
Dept: RADIOLOGY | Facility: CLINIC | Age: 28
Discharge: HOME | End: 2023-05-10
Attending: OBSTETRICS & GYNECOLOGY
Payer: COMMERCIAL

## 2023-05-10 ENCOUNTER — HOSPITAL ENCOUNTER (OUTPATIENT)
Dept: PERINATAL CARE | Facility: HOSPITAL | Age: 28
Discharge: HOME | End: 2023-05-10
Attending: OBSTETRICS & GYNECOLOGY
Payer: COMMERCIAL

## 2023-05-10 DIAGNOSIS — Z36.3 ANTENATAL SCREENING FOR MALFORMATION USING ULTRASONICS: ICD-10-CM

## 2023-05-10 DIAGNOSIS — O36.80X0 ENCOUNTER TO DETERMINE FETAL VIABILITY OF PREGNANCY, SINGLE OR UNSPECIFIED FETUS: Primary | ICD-10-CM

## 2023-05-10 DIAGNOSIS — Z36.87 ENCOUNTER FOR ANTENATAL SCREENING FOR UNCERTAIN DATES: ICD-10-CM

## 2023-05-10 DIAGNOSIS — Z3A.09 9 WEEKS GESTATION OF PREGNANCY: ICD-10-CM

## 2023-05-10 PROCEDURE — 76801 OB US < 14 WKS SINGLE FETUS: CPT

## 2023-10-19 ENCOUNTER — TRANSCRIBE ORDERS (OUTPATIENT)
Dept: SCHEDULING | Age: 28
End: 2023-10-19

## 2023-10-19 DIAGNOSIS — O24.419 GESTATIONAL DIABETES MELLITUS IN PREGNANCY, UNSPECIFIED CONTROL: Primary | ICD-10-CM

## 2023-10-20 ENCOUNTER — TELEMEDICINE (OUTPATIENT)
Dept: NUTRITION | Facility: HOSPITAL | Age: 28
End: 2023-10-20
Attending: OBSTETRICS & GYNECOLOGY
Payer: COMMERCIAL

## 2023-10-20 DIAGNOSIS — O24.410 DIET CONTROLLED GESTATIONAL DIABETES MELLITUS (GDM) IN THIRD TRIMESTER: Primary | ICD-10-CM

## 2023-10-20 PROCEDURE — G0108 DIAB MANAGE TRN  PER INDIV: HCPCS

## 2023-10-20 RX ORDER — FERROUS SULFATE 137(45) MG
TABLET, EXTENDED RELEASE ORAL
COMMUNITY
Start: 2023-09-22 | End: 2023-12-14 | Stop reason: HOSPADM

## 2023-10-20 NOTE — PROGRESS NOTES
Verification of Patient Location:  The patient affirms they are currently located in the following state: [place]    Request for Consent:  Audio and Video Encounter   Berna, my name is Nereyda Pereira.  Before we proceed, can you please verify your identification by telling me your full name and date of birth?  Can you tell me who is in the room with you?    You and I are about to have a telemedicine check-in or visit because you have requested it.  This is a live video-conference.  I am a real person, speaking to you in real time.  There is no one else with me on the video-conference.  However, when we use ([employer], [employer], etc) it is important for you to know that the video-conference may not be secure or private.  I am not recording this conversation and I am asking you not to record it.  This telemedicine visit will be billed to your health insurance or you, if you are self-insured.  You understand you will be responsible for any copayments or coinsurances that apply to your telemedicine visit.  Communication platform used for this encounter:  [employer] Video Visit (Epic Video Client)    Before starting our telemedicine visit, I am required to get your consent for this virtual check-in or visit by telemedicine. Do you consent?    Patient Response to Request for Consent:  Yes      Visit Documentation:  Subjective     Patient ID: Karen Sprague is a 27 y.o. female.  1995      HPI    The following have been reviewed and updated as appropriate in this visit:   Allergies  Meds  Problems       Review of Systems      Assessment/Plan   Diagnoses and all orders for this visit:    Diet controlled gestational diabetes mellitus (GDM) in third trimester (Primary)        Time Spent:  I spent 65 min  minutes on this date of service performing the following activities: providing counseling and education.  Diabetes Management Program    Subjective      Patient ID: Ms. Karen Sprague is a 27 y.o. female who has been  referred for diabetes assessment.      Comments: Patient educated on carbohydrate counting, meal spacing, label reading, food safety guidelines during pregnancy, sleep, exercise, fluid, protein, fiber, and fat. Patient verbalized understanding. Education material sent electronically.     Estimated Nutrition Needs: (calculated by RD)              Energy:2468 kcal/day (during pregnancy)              Carbohydrates:  216 grams a day (35% from CHO)  Protein:  - 107-154 g/day   Fiber: 25-30 g/day              Water: 80-96 oz/day     Nutrition Diagnosis:              P - Altered nutrition-related laboratory values (elevated blood sugars) related to              E - Gestational diabetes as evidenced by              S - Failed OGTT     Recommendation/Care Plan:  __ Continue current nutrition Plan  _x_ Change Diet: calculated energy needs, no fruit juice, no fruit or milk until lunch, meals/snacks 3-4 hours apart, no more than 8-10 hour overnight fast, always protein with CHO.         _x_ Encourage activity  __ Add snacks:   __ Add supplements:    Diabetes Medication/Instruction: Reviewed with patient.   Current Outpatient Medications:     ferrous sulfate (SLOW FE) 137 mg (45 mg iron) tablet extended release tablet, extended release, , Disp: , Rfl:     magnesium, , Disp: , Rfl:     prenatal vit no.130-iron-folic 27 mg iron- 800 mcg tablet tablet, Take 1 tablet by mouth daily., Disp: , Rfl:     Provided education on definition, diagnostic criteria of gestational diabetes (GDM)/diabetes (DM) during pregnancy and potential maternal fetal risk of complications.                                                                            Instructed on signs, symptoms, safety precautions of hyperglycemia, hypoglycemia with treatment rule of 15.                                                                                                                                               Reinforced individualized medication  management.                             Instructed on monitoring blood glucose equipment, frequency of testing, blood glucose goals for control, when and where to follow up with results and when to follow up if out of range.                Counseled importance of follow up care both prenatally and after delivery to assess glucose tolerance.    SBGM:  Meter Contour Next Gen   using    Schedule:  7 days per week     4 Tests per day fasting and 2hrs. pp  Comments: Already checking FBS today 79 mg/dL pp results yesterday all <112 mg/dL -was testing at 1 hour pp will change to 2 hour pp.      Plan: Instructed to follow-up with the [facility] for continued monitoring of blood sugar for the duration of the pregnancy.   Goals      Follow your diet plan as advised by your nutritionist      Record your blood sugar as directed          Barriers:  No identified barriers    Plans to overcome barriers: Written materials    Teaching directed to:patient      Nereyda Pereira RN BSN [facility]

## 2023-10-20 NOTE — LETTER
October 20, 2023     Tequila Rasmussen MD  [address] [place], [place] [address] [place] [address]    Patient: Karen Sprague   YOB: 1995   Date of Visit: 10/20/2023       Dear Dr. Rasmussen:    Thank you for referring Karen Sprague to me for evaluation. Below are my notes for this consultation.    If you have questions, please do not hesitate to call me. I look forward to following your patient along with you.         Sincerely,        Nereyda Pereira        CC: No Recipients      Verification of Patient Location:  The patient affirms they are currently located in the following state: [place]    Request for Consent:  Audio and Video Encounter   Berna, my name is Nereyda Pereira.  Before we proceed, can you please verify your identification by telling me your full name and date of birth?  Can you tell me who is in the room with you?    You and I are about to have a telemedicine check-in or visit because you have requested it.  This is a live video-conference.  I am a real person, speaking to you in real time.  There is no one else with me on the video-conference.  However, when we use ([employer], [employer], etc) it is important for you to know that the video-conference may not be secure or private.  I am not recording this conversation and I am asking you not to record it.  This telemedicine visit will be billed to your health insurance or you, if you are self-insured.  You understand you will be responsible for any copayments or coinsurances that apply to your telemedicine visit.  Communication platform used for this encounter:  [employer] Video Visit (Epic Video Client)    Before starting our telemedicine visit, I am required to get your consent for this virtual check-in or visit by telemedicine. Do you consent?    Patient Response to Request for Consent:  Yes      Visit Documentation:  Subjective     Patient ID: Karen Sprague is a 27 y.o. female.  1995      HPI    The following have been reviewed and updated  as appropriate in this visit:   Allergies  Meds  Problems       Review of Systems      Assessment/Plan   Diagnoses and all orders for this visit:    Diet controlled gestational diabetes mellitus (GDM) in third trimester (Primary)        Time Spent:  I spent 65 min  minutes on this date of service performing the following activities: providing counseling and education.  Diabetes Management Program    Subjective      Patient ID: Ms. Karen Sprague is a 27 y.o. female who has been referred for diabetes assessment.      Comments: Patient educated on carbohydrate counting, meal spacing, label reading, food safety guidelines during pregnancy, sleep, exercise, fluid, protein, fiber, and fat. Patient verbalized understanding. Education material sent electronically.     Estimated Nutrition Needs: (calculated by RD)              Energy:2468 kcal/day (during pregnancy)              Carbohydrates:  216 grams a day (35% from CHO)  Protein:  - 107-154 g/day   Fiber: 25-30 g/day              Water: 80-96 oz/day     Nutrition Diagnosis:              P - Altered nutrition-related laboratory values (elevated blood sugars) related to              E - Gestational diabetes as evidenced by              S - Failed OGTT     Recommendation/Care Plan:  __ Continue current nutrition Plan  _x_ Change Diet: calculated energy needs, no fruit juice, no fruit or milk until lunch, meals/snacks 3-4 hours apart, no more than 8-10 hour overnight fast, always protein with CHO.         _x_ Encourage activity  __ Add snacks:   __ Add supplements:    Diabetes Medication/Instruction: Reviewed with patient.   Current Outpatient Medications:     ferrous sulfate (SLOW FE) 137 mg (45 mg iron) tablet extended release tablet, extended release, , Disp: , Rfl:     magnesium, , Disp: , Rfl:     prenatal vit no.130-iron-folic 27 mg iron- 800 mcg tablet tablet, Take 1 tablet by mouth daily., Disp: , Rfl:     Provided education on definition, diagnostic  criteria of gestational diabetes (GDM)/diabetes (DM) during pregnancy and potential maternal fetal risk of complications.                                                                            Instructed on signs, symptoms, safety precautions of hyperglycemia, hypoglycemia with treatment rule of 15.                                                                                                                                               Reinforced individualized medication management.                             Instructed on monitoring blood glucose equipment, frequency of testing, blood glucose goals for control, when and where to follow up with results and when to follow up if out of range.                Counseled importance of follow up care both prenatally and after delivery to assess glucose tolerance.    SBGM:  Meter Contour Next Gen   using    Schedule:  7 days per week     4 Tests per day fasting and 2hrs. pp  Comments: Already checking FBS today 79 mg/dL pp results yesterday all <112 mg/dL -was testing at 1 hour pp will change to 2 hour pp.      Plan: Instructed to follow-up with the [facility] for continued monitoring of blood sugar for the duration of the pregnancy.   Goals      Follow your diet plan as advised by your nutritionist      Record your blood sugar as directed          Barriers:  No identified barriers    Plans to overcome barriers: Written materials    Teaching directed to:patient      Nereyda Pereira RN BSN [facility]

## 2023-10-25 ENCOUNTER — TELEMEDICINE (OUTPATIENT)
Dept: NUTRITION | Facility: HOSPITAL | Age: 28
End: 2023-10-25
Attending: OBSTETRICS & GYNECOLOGY
Payer: COMMERCIAL

## 2023-10-25 DIAGNOSIS — O24.410 DIET CONTROLLED GESTATIONAL DIABETES MELLITUS (GDM) IN THIRD TRIMESTER: Primary | ICD-10-CM

## 2023-10-25 PROCEDURE — 97802 MEDICAL NUTRITION INDIV IN: CPT | Performed by: DIETITIAN, REGISTERED

## 2023-11-10 LAB — GP B STREP SPEC QL CULT: NO GROWTH

## 2023-11-28 PROBLEM — Z00.00 ENCOUNTER FOR GENERAL ADULT MEDICAL EXAMINATION WITHOUT ABNORMAL FINDINGS: Status: ACTIVE | Noted: 2023-11-28

## 2023-11-29 ENCOUNTER — OFFICE VISIT (OUTPATIENT)
Dept: FAMILY MEDICINE | Facility: CLINIC | Age: 28
End: 2023-11-29
Payer: COMMERCIAL

## 2023-11-29 VITALS
BODY MASS INDEX: 34.91 KG/M2 | SYSTOLIC BLOOD PRESSURE: 104 MMHG | RESPIRATION RATE: 16 BRPM | DIASTOLIC BLOOD PRESSURE: 66 MMHG | HEART RATE: 77 BPM | HEIGHT: 66 IN | WEIGHT: 217.2 LBS | TEMPERATURE: 98.1 F | OXYGEN SATURATION: 98 %

## 2023-11-29 DIAGNOSIS — D50.9 IRON DEFICIENCY ANEMIA OF PREGNANCY: ICD-10-CM

## 2023-11-29 DIAGNOSIS — L98.9 SKIN LESION: ICD-10-CM

## 2023-11-29 DIAGNOSIS — Z11.59 NEED FOR HEPATITIS B SCREENING TEST: ICD-10-CM

## 2023-11-29 DIAGNOSIS — Z11.59 NEED FOR HEPATITIS C SCREENING TEST: ICD-10-CM

## 2023-11-29 DIAGNOSIS — O99.019 IRON DEFICIENCY ANEMIA OF PREGNANCY: ICD-10-CM

## 2023-11-29 DIAGNOSIS — O24.410 DIET CONTROLLED GESTATIONAL DIABETES MELLITUS (GDM) IN THIRD TRIMESTER: ICD-10-CM

## 2023-11-29 DIAGNOSIS — D68.00 VON WILLEBRANDS DISEASE (CMS/HCC): ICD-10-CM

## 2023-11-29 DIAGNOSIS — Z00.00 ENCOUNTER FOR GENERAL ADULT MEDICAL EXAMINATION WITHOUT ABNORMAL FINDINGS: Primary | ICD-10-CM

## 2023-11-29 PROBLEM — L85.8 KERATOSIS PILARIS: Status: ACTIVE | Noted: 2023-11-29

## 2023-11-29 PROCEDURE — 99385 PREV VISIT NEW AGE 18-39: CPT | Performed by: STUDENT IN AN ORGANIZED HEALTH CARE EDUCATION/TRAINING PROGRAM

## 2023-11-29 ASSESSMENT — ENCOUNTER SYMPTOMS
COUGH: 0
UNEXPECTED WEIGHT CHANGE: 0
BACK PAIN: 0
BRUISES/BLEEDS EASILY: 0
COLOR CHANGE: 0
FEVER: 0
DIZZINESS: 0
ADENOPATHY: 0
NECK PAIN: 0
DYSPHORIC MOOD: 0
CHEST TIGHTNESS: 0
CONSTIPATION: 0
VOMITING: 0
FATIGUE: 0
WEAKNESS: 0
SORE THROAT: 0
HEADACHES: 0
HEMATURIA: 0
SHORTNESS OF BREATH: 0
DYSURIA: 0
PALPITATIONS: 0
DIFFICULTY URINATING: 0
ROS SKIN COMMENTS: SKIN LESION
CHILLS: 0
DIARRHEA: 0
EYE PAIN: 0
BLOOD IN STOOL: 0
ABDOMINAL PAIN: 0
NAUSEA: 0

## 2023-11-29 ASSESSMENT — PATIENT HEALTH QUESTIONNAIRE - PHQ9: SUM OF ALL RESPONSES TO PHQ9 QUESTIONS 1 & 2: 0

## 2023-11-29 NOTE — ASSESSMENT & PLAN NOTE
Ordered labs  Blood pressure stable  UTD eye appointment   UTD dentist appt   Continue with healthy lifestyle modifications including a well-balanced diet with fruits and vegetables, drinking plenty of water and moderate intensity physical activity for approximately 150 minutes/week  Declined covid and flu vaccine   Schedule next annual physical in 1 year

## 2023-11-29 NOTE — ASSESSMENT & PLAN NOTE
Referral to Derm for eval- pt notes she would also like complete skin exam  Advised to monitor for any changes in size or appearance ABCDE

## 2023-11-29 NOTE — PROGRESS NOTES
Subjective      Patient ID: Karen Sprague is a 28 y.o. female.  1995      HPI  CC:   Chief Complaint   Patient presents with    Annual Exam     Pt here for PE and states that she has a spot on her left thigh that is concerning and she would like looked at. She is currently 38 weeks pregnant     Subjective   Karen Sprague is a 28 y.o. female presenting for annual physical exam  ?    # Annual Physical:  Last annual physical and labs: Dr. Daily, years ago   PMH: VWD (Seen by Heme previously), gest DM (diet controlled), hx abnormal pap (7-8 years ago), keratosis pilaris, Fe def anemia in pregnancy taking Fe  Diet: well-balanced, fruits and vegetables. Drinks plenty of water.  Exercise: 7x/week  Alcohol use:denies   Drug use: denies  Smoking history: denies  Dental: sees dentist twice yearly  Eye exam: yearly  Denies family hx of colon cancer, hx of bowel changes, blood in stool.   Little interest or pleasure in doing things: denies  Feelings of depression or hopelessness: denies    Work: Hairdresser   Relationship:    Family: Almost 4 year old daughter, 38 weeks pregnant      Periods: irregular usually   LMP: 2/6/23   GYN history: Pap UTD     Noticed a spot on left thigh 1 month ago.   Hx of KP.   Has not grown.   States it has been getting smaller   Flesh colored   Not painful    Patient History   Past Medical History:   Diagnosis Date    Abnormal Pap smear of cervix     Blood disorder     Gestational diabetes     Von Willebrands disease (CMS/HCC)      Past Surgical History:   Procedure Laterality Date    FOOT SURGERY      ingrown toenails    TONSILLECTOMY      WISDOM TOOTH EXTRACTION       Family History   Problem Relation Age of Onset    Stroke Maternal Grandmother     Cancer Maternal Grandfather     Cancer Biological Mother     Hypertension Biological Mother      No Known Allergies  Social History     Socioeconomic History    Marital status:      Spouse name: Jcarlos Goyal  of children: None    Years of education: None    Highest education level: None   Occupational History    Occupation: Hairdresser   Tobacco Use    Smoking status: Never    Smokeless tobacco: Never   Substance and Sexual Activity    Alcohol use: Never    Drug use: Never    Sexual activity: Yes     Partners: Male     Social Determinants of Health     Financial Resource Strain: Low Risk  (1/17/2020)    Overall Financial Resource Strain (CARDIA)     Difficulty of Paying Living Expenses: Not hard at all   Food Insecurity: No Food Insecurity (1/17/2020)    Hunger Vital Sign     Worried About Running Out of Food in the Last Year: Never true     Ran Out of Food in the Last Year: Never true   Transportation Needs: No Transportation Needs (1/17/2020)    PRAPARE - Transportation     Lack of Transportation (Medical): No     Lack of Transportation (Non-Medical): No       Current Outpatient Medications:     ferrous sulfate (SLOW FE) 137 mg (45 mg iron) tablet extended release tablet, extended release, , Disp: , Rfl:     magnesium, , Disp: , Rfl:     prenatal vit no.130-iron-folic 27 mg iron- 800 mcg tablet tablet, Take 1 tablet by mouth daily., Disp: , Rfl:              The following have been reviewed and updated as appropriate in this visit:   Allergies  Meds  Problems       Review of Systems   Constitutional: Negative for chills, fatigue, fever and unexpected weight change.   HENT: Negative for congestion, ear pain, hearing loss and sore throat.    Eyes: Negative for pain and visual disturbance.   Respiratory: Negative for cough, chest tightness and shortness of breath.    Cardiovascular: Negative for chest pain and palpitations.   Gastrointestinal: Negative for abdominal pain, blood in stool, constipation, diarrhea, nausea and vomiting.   Genitourinary: Negative for difficulty urinating, dysuria and hematuria.   Musculoskeletal: Negative for back pain and neck pain.   Skin: Negative for color change and  "rash.        Skin lesion   Neurological: Negative for dizziness, weakness and headaches.   Hematological: Negative for adenopathy. Does not bruise/bleed easily.   Psychiatric/Behavioral: Negative for dysphoric mood.       Objective     Vitals:    11/29/23 1304   BP: 104/66   BP Location: Left upper arm   Patient Position: Sitting   Pulse: 77   Resp: 16   Temp: 36.7 °C (98.1 °F)   TempSrc: Temporal   SpO2: 98%   Weight: 98.5 kg (217 lb 3.2 oz)   Height: 1.676 m (5' 6\")     Body mass index is 35.06 kg/m².    Physical Exam  Vitals and nursing note reviewed.   Constitutional:       General: She is not in acute distress.     Appearance: She is well-developed. She is not ill-appearing, toxic-appearing or diaphoretic.   HENT:      Head: Normocephalic and atraumatic.      Right Ear: Tympanic membrane normal. There is no impacted cerumen.      Left Ear: Tympanic membrane normal. There is no impacted cerumen.      Nose: Nose normal. No congestion or rhinorrhea.      Mouth/Throat:      Mouth: Mucous membranes are moist.      Pharynx: Oropharynx is clear.   Eyes:      General:         Right eye: No discharge.         Left eye: No discharge.      Extraocular Movements: Extraocular movements intact.      Conjunctiva/sclera: Conjunctivae normal.      Pupils: Pupils are equal, round, and reactive to light.   Cardiovascular:      Rate and Rhythm: Normal rate and regular rhythm.      Heart sounds: Normal heart sounds.   Pulmonary:      Effort: Pulmonary effort is normal. No respiratory distress.      Breath sounds: Normal breath sounds. No wheezing.   Abdominal:      General: Bowel sounds are normal. There is no distension.      Palpations: Abdomen is soft.      Tenderness: There is no abdominal tenderness. There is no right CVA tenderness, left CVA tenderness, guarding or rebound.   Musculoskeletal:         General: Normal range of motion.      Cervical back: Normal range of motion and neck supple. No tenderness.      Right lower " leg: No edema.      Left lower leg: No edema.   Skin:     General: Skin is warm and dry.      Findings: Lesion (small, flesh-colored, round, dry papule. no irregular borders/asymmetry noted.  Evenly colored. ) present.   Neurological:      Mental Status: She is alert and oriented to person, place, and time.   Psychiatric:         Mood and Affect: Mood normal.         Behavior: Behavior normal.         Assessment/Plan   Diagnoses and all orders for this visit:    Encounter for general adult medical examination without abnormal findings (Primary)  Assessment & Plan:  Ordered labs  Blood pressure stable  UTD eye appointment   UTD dentist appt   Continue with healthy lifestyle modifications including a well-balanced diet with fruits and vegetables, drinking plenty of water and moderate intensity physical activity for approximately 150 minutes/week  Declined covid and flu vaccine   Schedule next annual physical in 1 year      Orders:  -     CBC and Differential; Future  -     Comprehensive metabolic panel; Future  -     Lipid panel; Future    Need for hepatitis C screening test  -     Hepatitis C antibody; Future    Need for hepatitis B screening test  -     Hepatitis B Surface Antibody Immunity, Quantitative; Future    Skin lesion  Assessment & Plan:  Referral to Derm for eval- pt notes she would also like complete skin exam  Advised to monitor for any changes in size or appearance ABCDE      Orders:  -     Ambulatory referral to Dermatology; Future    Diet controlled gestational diabetes mellitus (GDM) in third trimester  Assessment & Plan:  Following with OB    Orders:  -     Hemoglobin A1c; Future    Von Willebrands disease (CMS/HCC)  Assessment & Plan:  Seen by Heme previously  Asymptomatic   Discussed red flag signs to watch for        Iron deficiency anemia of pregnancy  Assessment & Plan:  Pt is taking Fe supp and tolerating well      Return in about 1 year (around 11/29/2024), or if symptoms worsen or fail to  improve.

## 2023-12-12 ENCOUNTER — ANESTHESIA (INPATIENT)
Dept: OBSTETRICS AND GYNECOLOGY | Facility: HOSPITAL | Age: 28
End: 2023-12-12
Payer: COMMERCIAL

## 2023-12-12 ENCOUNTER — ANESTHESIA EVENT (INPATIENT)
Dept: OBSTETRICS AND GYNECOLOGY | Facility: HOSPITAL | Age: 28
End: 2023-12-12
Payer: COMMERCIAL

## 2023-12-12 ENCOUNTER — HOSPITAL ENCOUNTER (INPATIENT)
Facility: HOSPITAL | Age: 28
LOS: 2 days | Discharge: HOME | End: 2023-12-14
Attending: OBSTETRICS & GYNECOLOGY | Admitting: OBSTETRICS & GYNECOLOGY
Payer: COMMERCIAL

## 2023-12-12 PROBLEM — E66.9 OBESITY: Status: ACTIVE | Noted: 2023-12-12

## 2023-12-12 PROBLEM — Z34.90 ENCOUNTER FOR PLANNED INDUCTION OF LABOR: Status: ACTIVE | Noted: 2023-12-12

## 2023-12-12 LAB
ABO + RH BLD: NORMAL
BLD GP AB SCN SERPL QL: NEGATIVE
D AG BLD QL: POSITIVE
ERYTHROCYTE [DISTWIDTH] IN BLOOD BY AUTOMATED COUNT: 14.1 % (ref 11.7–14.4)
GLUCOSE BLD-MCNC: 84 MG/DL (ref 70–99)
HBV SURFACE AG SER QL: NONREACTIVE
HCT VFR BLDCO AUTO: 42.6 % (ref 35–45)
HGB BLD-MCNC: 13.8 G/DL (ref 11.8–15.7)
LABORATORY COMMENT REPORT: NORMAL
MCH RBC QN AUTO: 28.8 PG (ref 28–33.2)
MCHC RBC AUTO-ENTMCNC: 32.4 G/DL (ref 32.2–35.5)
MCV RBC AUTO: 88.9 FL (ref 83–98)
PDW BLD AUTO: 12.2 FL (ref 9.4–12.3)
PLATELET # BLD AUTO: 160 K/UL (ref 150–369)
POCT TEST: NORMAL
RBC # BLD AUTO: 4.79 M/UL (ref 3.93–5.22)
SPECIMEN EXP DATE BLD: NORMAL
T PALLIDUM AB SER QL IF: NONREACTIVE
WBC # BLD AUTO: 8.52 K/UL (ref 3.8–10.5)

## 2023-12-12 PROCEDURE — 86780 TREPONEMA PALLIDUM: CPT | Performed by: OBSTETRICS & GYNECOLOGY

## 2023-12-12 PROCEDURE — 3E033VJ INTRODUCTION OF OTHER HORMONE INTO PERIPHERAL VEIN, PERCUTANEOUS APPROACH: ICD-10-PCS | Performed by: OBSTETRICS & GYNECOLOGY

## 2023-12-12 PROCEDURE — 63600000 HC DRUGS/DETAIL CODE: Mod: JZ | Performed by: OBSTETRICS & GYNECOLOGY

## 2023-12-12 PROCEDURE — 25000000 HC PHARMACY GENERAL: Performed by: OBSTETRICS & GYNECOLOGY

## 2023-12-12 PROCEDURE — 72000011 HC VAGINAL DELIVERY LEVEL 1

## 2023-12-12 PROCEDURE — 25000000 HC PHARMACY GENERAL

## 2023-12-12 PROCEDURE — 36415 COLL VENOUS BLD VENIPUNCTURE: CPT | Performed by: OBSTETRICS & GYNECOLOGY

## 2023-12-12 PROCEDURE — 0KQM0ZZ REPAIR PERINEUM MUSCLE, OPEN APPROACH: ICD-10-PCS | Performed by: OBSTETRICS & GYNECOLOGY

## 2023-12-12 PROCEDURE — 85246 CLOT FACTOR VIII VW ANTIGEN: CPT | Performed by: OBSTETRICS & GYNECOLOGY

## 2023-12-12 PROCEDURE — 87340 HEPATITIS B SURFACE AG IA: CPT | Performed by: OBSTETRICS & GYNECOLOGY

## 2023-12-12 PROCEDURE — 25000000 HC PHARMACY GENERAL: Performed by: ANESTHESIOLOGY

## 2023-12-12 PROCEDURE — 27200130 HC EPIDURAL ANES TRAY

## 2023-12-12 PROCEDURE — 63600000 HC DRUGS/DETAIL CODE: Mod: JW | Performed by: ANESTHESIOLOGY

## 2023-12-12 PROCEDURE — 85027 COMPLETE CBC AUTOMATED: CPT | Performed by: OBSTETRICS & GYNECOLOGY

## 2023-12-12 PROCEDURE — 86850 RBC ANTIBODY SCREEN: CPT

## 2023-12-12 PROCEDURE — 12000000 HC ROOM AND CARE MED/SURG

## 2023-12-12 PROCEDURE — 37000005 HC ANESTHESIA EPIDURAL/SPINAL: Performed by: ANESTHESIOLOGY

## 2023-12-12 PROCEDURE — 25800000 HC PHARMACY IV SOLUTIONS: Performed by: OBSTETRICS & GYNECOLOGY

## 2023-12-12 PROCEDURE — 85247 CLOT FACTOR VIII MULTIMETRIC: CPT | Performed by: OBSTETRICS & GYNECOLOGY

## 2023-12-12 PROCEDURE — 10907ZC DRAINAGE OF AMNIOTIC FLUID, THERAPEUTIC FROM PRODUCTS OF CONCEPTION, VIA NATURAL OR ARTIFICIAL OPENING: ICD-10-PCS | Performed by: OBSTETRICS & GYNECOLOGY

## 2023-12-12 RX ORDER — OXYTOCIN 10 [USP'U]/ML
10 INJECTION, SOLUTION INTRAMUSCULAR; INTRAVENOUS ONCE AS NEEDED
Status: DISCONTINUED | OUTPATIENT
Start: 2023-12-12 | End: 2023-12-13

## 2023-12-12 RX ORDER — FENTANYL/ROPIVACAINE/NS/PF 2-1500 MCG
PREFILLED PUMP RESERVOIR INJECTION
Status: COMPLETED
Start: 2023-12-12 | End: 2023-12-12

## 2023-12-12 RX ORDER — SODIUM CHLORIDE, SODIUM LACTATE, POTASSIUM CHLORIDE, CALCIUM CHLORIDE 600; 310; 30; 20 MG/100ML; MG/100ML; MG/100ML; MG/100ML
INJECTION, SOLUTION INTRAVENOUS CONTINUOUS
Status: DISCONTINUED | OUTPATIENT
Start: 2023-12-12 | End: 2023-12-13

## 2023-12-12 RX ORDER — OXYTOCIN/0.9 % SODIUM CHLORIDE 40/1000ML
500 PLASTIC BAG, INJECTION (ML) INTRAVENOUS ONCE
Status: COMPLETED | OUTPATIENT
Start: 2023-12-12 | End: 2023-12-12

## 2023-12-12 RX ORDER — OXYTOCIN/0.9 % SODIUM CHLORIDE 30/500 ML
0-20 PLASTIC BAG, INJECTION (ML) INTRAVENOUS CONTINUOUS
Status: DISCONTINUED | OUTPATIENT
Start: 2023-12-12 | End: 2023-12-13

## 2023-12-12 RX ORDER — TRANEXAMIC ACID 10 MG/ML
1000 INJECTION, SOLUTION INTRAVENOUS ONCE AS NEEDED
Status: DISCONTINUED | OUTPATIENT
Start: 2023-12-12 | End: 2023-12-13

## 2023-12-12 RX ORDER — CARBOPROST TROMETHAMINE 250 UG/ML
250 INJECTION, SOLUTION INTRAMUSCULAR ONCE AS NEEDED
Status: DISCONTINUED | OUTPATIENT
Start: 2023-12-12 | End: 2023-12-13

## 2023-12-12 RX ORDER — METHYLERGONOVINE MALEATE 0.2 MG/ML
200 INJECTION INTRAVENOUS ONCE AS NEEDED
Status: DISCONTINUED | OUTPATIENT
Start: 2023-12-12 | End: 2023-12-13

## 2023-12-12 RX ORDER — FENTANYL/ROPIVACAINE/NS/PF 2-1500 MCG
PREFILLED PUMP RESERVOIR INJECTION CONTINUOUS
Status: DISCONTINUED | OUTPATIENT
Start: 2023-12-12 | End: 2023-12-13

## 2023-12-12 RX ORDER — BUPIVACAINE HYDROCHLORIDE 2.5 MG/ML
INJECTION, SOLUTION EPIDURAL; INFILTRATION; INTRACAUDAL
Status: COMPLETED | OUTPATIENT
Start: 2023-12-12 | End: 2023-12-12

## 2023-12-12 RX ORDER — MISOPROSTOL 200 UG/1
1000 TABLET ORAL ONCE AS NEEDED
Status: DISCONTINUED | OUTPATIENT
Start: 2023-12-12 | End: 2023-12-13

## 2023-12-12 RX ORDER — SODIUM CHLORIDE, SODIUM LACTATE, POTASSIUM CHLORIDE, CALCIUM CHLORIDE 600; 310; 30; 20 MG/100ML; MG/100ML; MG/100ML; MG/100ML
125 INJECTION, SOLUTION INTRAVENOUS CONTINUOUS
Status: ACTIVE | OUTPATIENT
Start: 2023-12-12 | End: 2023-12-12

## 2023-12-12 RX ORDER — LIDOCAINE HYDROCHLORIDE AND EPINEPHRINE 15; 5 MG/ML; UG/ML
INJECTION, SOLUTION EPIDURAL
Status: COMPLETED | OUTPATIENT
Start: 2023-12-12 | End: 2023-12-12

## 2023-12-12 RX ORDER — LIDOCAINE HYDROCHLORIDE 10 MG/ML
0-30 INJECTION, SOLUTION EPIDURAL; INFILTRATION; INTRACAUDAL; PERINEURAL ONCE AS NEEDED
Status: DISCONTINUED | OUTPATIENT
Start: 2023-12-12 | End: 2023-12-13

## 2023-12-12 RX ORDER — EPHEDRINE SULFATE/0.9% NACL/PF 50 MG/5 ML
10 SYRINGE (ML) INTRAVENOUS EVERY 10 MIN PRN
Status: DISCONTINUED | OUTPATIENT
Start: 2023-12-12 | End: 2023-12-13

## 2023-12-12 RX ORDER — OXYTOCIN/0.9 % SODIUM CHLORIDE 40/1000ML
PLASTIC BAG, INJECTION (ML) INTRAVENOUS CONTINUOUS
Status: DISCONTINUED | OUTPATIENT
Start: 2023-12-12 | End: 2023-12-13

## 2023-12-12 RX ADMIN — OXYTOCIN 20 UNITS: 10 INJECTION, SOLUTION INTRAMUSCULAR; INTRAVENOUS at 19:50

## 2023-12-12 RX ADMIN — SODIUM CHLORIDE, POTASSIUM CHLORIDE, SODIUM LACTATE AND CALCIUM CHLORIDE: 600; 310; 30; 20 INJECTION, SOLUTION INTRAVENOUS at 17:39

## 2023-12-12 RX ADMIN — OXYTOCIN: 10 INJECTION, SOLUTION INTRAMUSCULAR; INTRAVENOUS at 20:24

## 2023-12-12 RX ADMIN — LIDOCAINE HYDROCHLORIDE,EPINEPHRINE BITARTRATE 5 ML: 15; .005 INJECTION, SOLUTION EPIDURAL; INFILTRATION; INTRACAUDAL; PERINEURAL at 17:45

## 2023-12-12 RX ADMIN — Medication 1 MILLI-UNITS/MIN: at 14:27

## 2023-12-12 RX ADMIN — Medication 50 ML: at 17:29

## 2023-12-12 RX ADMIN — BUPIVACAINE HYDROCHLORIDE 5 ML: 2.5 INJECTION, SOLUTION EPIDURAL; INFILTRATION; INTRACAUDAL at 17:45

## 2023-12-12 RX ADMIN — SODIUM CHLORIDE, POTASSIUM CHLORIDE, SODIUM LACTATE AND CALCIUM CHLORIDE 1000 ML: 600; 310; 30; 20 INJECTION, SOLUTION INTRAVENOUS at 13:28

## 2023-12-12 NOTE — PROGRESS NOTES
Dr Garay of hematology saw pt in consult.  I discussed the patient with her.  Herb flowers panel ordered.  Pt was given Dr Mendieta's office number to f/u as an outpatient.  Can give TXA if needed pp  Unclear if pt would require dDAVP or if she would be a responder to it.

## 2023-12-12 NOTE — H&P
HPI     Karen Sprague is a 28 y.o. female  at 40w2d with an estimated due date of 12/10/2023   who presents from the office with a non reactive NST after c/o decreased FM.    Pt had been scheduled for cervical ripening this evening to a start an induction - will keep for delivery    1. Unclear history of vWD. Her mother has vWD. Pt was not given dDAVP with her previous delivery per heme onco.  2. A1GDM  3. Anemia      OB History:   OB History    Para Term  AB Living   2 1 1 0 0 1   SAB IAB Ectopic Multiple Live Births   0 0 0 0 1      # Outcome Date GA Lbr Jose De Jesus/2nd Weight Sex Delivery Anes PTL Lv   2 Current            1 Term 20 41w0d 05:42 / 00:42 3.755 kg (8 lb 4.5 oz) F Vag-Spont EPI N ANITHA      Name: DALIA,GIRL      Apgar1: 8  Apgar5: 9       Medical History:   Past Medical History:   Diagnosis Date   • Abnormal Pap smear of cervix    • Blood disorder    • Gestational diabetes    • Von Willebrands disease (CMS/HCC)        Surgical History:   Past Surgical History:   Procedure Laterality Date   • FOOT SURGERY      ingrown toenails   • TONSILLECTOMY     • WISDOM TOOTH EXTRACTION         Social History:   Social History     Socioeconomic History   • Marital status:      Spouse name: Jcarlos   • Number of children: None   • Years of education: None   • Highest education level: None   Occupational History   • Occupation: Hairdresser   Tobacco Use   • Smoking status: Never   • Smokeless tobacco: Never   Substance and Sexual Activity   • Alcohol use: Never   • Drug use: Never   • Sexual activity: Yes     Partners: Male     Social Determinants of Health     Financial Resource Strain: Low Risk  (2020)    Overall Financial Resource Strain (CARDIA)    • Difficulty of Paying Living Expenses: Not hard at all   Food Insecurity: No Food Insecurity (2020)    Hunger Vital Sign    • Worried About Running Out of Food in the Last Year: Never true    • Ran Out of Food in the Last Year:  Never true   Transportation Needs: No Transportation Needs (1/17/2020)    PRAPARE - Transportation    • Lack of Transportation (Medical): No    • Lack of Transportation (Non-Medical): No        Family History:   Family History   Problem Relation Age of Onset   • Stroke Maternal Grandmother    • Diabetes Maternal Grandfather    • Cancer Maternal Grandfather    • Cancer Biological Mother    • Hypertension Biological Mother        Allergies: Patient has no known allergies.    Prior to Admission medications    Medication Sig Start Date End Date Taking? Authorizing Provider   ferrous sulfate (SLOW FE) 137 mg (45 mg iron) tablet extended release tablet, extended release  9/22/23   Provider, Tess Anne MD   magnesium  9/22/23   ProviderTess MD   prenatal vit no.130-iron-folic 27 mg iron- 800 mcg tablet tablet Take 1 tablet by mouth daily.    Provider, Tess Anne MD       Review of Systems  Pertinent items are noted in HPI.    Objective     Vital Signs for the last 24 hours:  Temp:  [36.8 °C (98.3 °F)] 36.8 °C (98.3 °F)  Heart Rate:  [110] 110  Resp:  [18] 18  BP: (118)/(76) 118/76    Latest cervical exam:  Cervical Dilation (cm): 3-4  Cervical Effacement: 60   -3     Method: sterile exam per physician (12/12/23 1314)      Fetal Monitoring:  FHR Baseline: 130  FHR Variability: mod  FHR Accelerations: yes  FHR Decelerations: no    Contraction Frequency: none    Exam:  General Appearance: Alert, cooperative, no acute distress  Lungs:  respirations unlabored  Heart: Regular rate  Abdomen: gravid, nontender  Genitalia: See vaginal exam  Extremities: no edema or calf tenderness  Neurologic: grossly intact without focal deficits        Labs:  ABO   Date Value Ref Range Status   01/17/2020 B  Final     Rh Factor   Date Value Ref Range Status   01/17/2020 Positive  Final     Rubella IgG Scr   Date Value Ref Range Status   06/17/2019 immune  Final     Strep Gp B Cult/DNA Probe   Date Value Ref Range Status    2019 No Group B Streptococcus Isolated See table below, No growth at 18-24 hours, Probable contaminants, suggest recollection, No growth at 48 hours, No growth at 72 hours, No growth at 96 hours, No growth at 120 hours, No growth at 1 week, No growth at 2 weeks, No growth at 3 weeks, No gr... Final       Assessment/Plan     Karen Sprague is a 28 y.o. female  at 40w2d admitted for induction of labor after decreased FM and a non reactive strip in the office.    FHR: Category I after scalp stim  GBS: negative   Continuous fetal monitoring  Start pitocin  Fetal head is ballotable - will AROM later    Paulette Mascorro MD

## 2023-12-12 NOTE — ANESTHESIOLOGIST PRE-PROCEDURE ATTESTATION
Pre-Procedure Patient Identification:  I am the Primary Anesthesiologist and have identified the patient on 12/12/23 at 5:44 PM.   I have confirmed the procedure(s) will be performed by the following surgeon/proceduralist * Surgery not found *.

## 2023-12-12 NOTE — PROGRESS NOTES
Labor and Delivery Progress Note    Subjective     Interval History: none.     Patient uncomfortable, mildly    Objective     Vital Signs for the last 24 hours:  Temp:  [36.8 °C (98.3 °F)] 36.8 °C (98.3 °F)  Heart Rate:  [] 75  Resp:  [16-18] 16  BP: (118-126)/(76-78) 126/78        Contraction Frequency: Q 2-3 mins    Latest cervical exam:  4+/90/-2      Assessment/Plan     Karen Sprague is a 28 y.o. female  at 40w2d  FHR: Category I  AROM - clear    Paulette Mascorro MD

## 2023-12-12 NOTE — ANESTHESIA PREPROCEDURE EVALUATION
Anesthesia ROS/MED HX    Anesthesia History - neg  Pulmonary - neg  Neuro/Psych - neg  Cardiovascular- neg  Hematological - neg  GI/Hepatic- neg  Musculoskeletal- neg  Renal Disease- neg  Endo/Other   Diabetes      HPI: 28F  here for IOL due to post dates. Possible hx of VWf?    Allergies: No Known Allergies    ROS: Denies any chest pain or shortness of breath    PMH:   Past Medical History:   Diagnosis Date   • Abnormal Pap smear of cervix    • Blood disorder    • Gestational diabetes    • Von Willebrands disease (CMS/HCC)        PSH:   Past Surgical History:   Procedure Laterality Date   • FOOT SURGERY      ingrown toenails   • TONSILLECTOMY     • WISDOM TOOTH EXTRACTION         No current facility-administered medications on file prior to encounter.     No current outpatient medications on file prior to encounter.       CBC Results       23     1329 0831    WBC 8.52 13.28    RBC 4.79 4.39    HGB 13.8 13.0    HCT 42.6 39.5    MCV 88.9 90.0    MCH 28.8 29.6    MCHC 32.4 32.9     177        BMP Results    No lab values to display.                 Physical Exam    Airway   Mallampati: I   TM distance: <3 FB   Neck ROM: full  Cardiovascular - normal   Rhythm: regular   Rate: normalPulmonary - normal   clear to auscultation  Other Findings   Back - neg   landmarks identified    Dental - normal        Anesthesia Plan    Plan: labor epidural    Technique: epidural     Airway: natural airway / supplemental oxygen   2 ASA  Anesthetic plan and risks discussed with: patient  Postop Plan:   Patient Disposition: inpatient floor planned admission  Comments:    Plan: Epidural

## 2023-12-12 NOTE — ANESTHESIA PROCEDURE NOTES
Epidural Block    Patient location during procedure: OB  Start time: 12/12/2023 5:44 PM  Reason for block: labor analgesia requested by patient and obstetrician  Staffing  Performed: anesthesiologist   Anesthesiologist: Adalid Greene MD  Performed by: Adalid Greene MD  Authorized by: Adalid Greene MD    Preanesthetic Checklist  Completed: patient identified, surgical consent, pre-op evaluation, timeout performed, IV checked, risks and benefits discussed, monitors and equipment checked, anesthesia consent given and sterile field maintained during procedure  Needle  Needle type: Rosas   Needle gauge: 17 G  Needle length: 3.5 in  Needle insertion depth: 4 cm  Catheter type: Single orifice  Catheter size: 19 G  Catheter at skin depth: 10 cm  Test dose: negative and lidocaine 1.5% with epinephrine 1-to-200,000  Assessment  Sensory level: T4  Additional Notes  After the patient was prepped and draped in the standard sterile fashion, an epidural needle was inserted into the midline lumbar spine until contacting ligamentum flavum. COURTNEY to saline was at 4 cm and the epidural catheter threaded easily and secured at 10 cm at the skin. After a negative aspiration to both heme and CSF, a test dose of 5ml's 1.5% Lidocaine w/ 1:200K epinephrine was given via the epidural and it was negative. A loading dose of 5ml's 0.25% Bupivacaine was then given. Patient tolerated the procedure well and is now comfortable.        Medications Administered -   lidocaine 1.5%-EPINEPHrine 1:200,000 PF (XYLOCAINE W/EPI) injection - epidural   5 mL - 12/12/2023 5:45:00 PM  bupivacaine PF (MARCAINE) injection 0.25 % - epidural   5 mL - 12/12/2023 5:45:00 PM

## 2023-12-12 NOTE — CONSULTS
Hematology/Oncology Consult Note       Reason for Consult:  Von Willebrand's    History of Present Illness: Karen Sprague is a 28 y.o. female with reported h/o von Willebrand's disease admitted for induction of labor due to non reactive stress test and decreased fetal movements.  Patient notes she had von Willebrands diagnosed as a result of heavy menstrual periods/nosebleeds and her mother has von Willebrands. She states she cannot recall if she had DDAVP for prior tonsillectomy age 11.  Had prior normal vaginal delivery which went fine without DDAVP and notes that she was told through prenatal care that she would be fine, but concern about bleeding associated with possible C section.  Patient seen while getting epidural.      Medical History  Past Medical History:   Diagnosis Date   • Abnormal Pap smear of cervix    • Blood disorder    • Gestational diabetes    • Von Willebrands disease (CMS/HCC)        Surgical History   Past Surgical History:   Procedure Laterality Date   • FOOT SURGERY      ingrown toenails   • TONSILLECTOMY     • WISDOM TOOTH EXTRACTION         Allergies  Patient has no known allergies.    Current Meds  Current Facility-Administered Medications   Medication Dose Route Frequency   • ePHEDrine  10 mg intravenous q10 min PRN   • fentaNYL-ropivacaine-NaCl (PF)   epidural Continuous   • lactated ringer's  125 mL/hr intravenous Continuous    And   • oxytocin in 0.9 % NSS  0-20 giselle-units/min intravenous Continuous   • lactated ringer's   intravenous Continuous       Home Meds  •  SLOW FE,   •  magnesium,   •  prenatal vit no.130-iron-folic, Take 1 tablet by mouth daily.    Social History  Social History     Socioeconomic History   • Marital status:      Spouse name: Jcarlos   • Number of children: None   • Years of education: None   • Highest education level: None   Occupational History   • Occupation: Hairdresser   Tobacco Use   • Smoking status: Never   • Smokeless tobacco: Never    Substance and Sexual Activity   • Alcohol use: Never   • Drug use: Never   • Sexual activity: Yes     Partners: Male     Social Determinants of Health     Financial Resource Strain: Low Risk  (1/17/2020)    Overall Financial Resource Strain (CARDIA)    • Difficulty of Paying Living Expenses: Not hard at all   Food Insecurity: No Food Insecurity (1/17/2020)    Hunger Vital Sign    • Worried About Running Out of Food in the Last Year: Never true    • Ran Out of Food in the Last Year: Never true   Transportation Needs: No Transportation Needs (1/17/2020)    PRAPARE - Transportation    • Lack of Transportation (Medical): No    • Lack of Transportation (Non-Medical): No       Family History  Family History   Problem Relation Age of Onset   • Stroke Maternal Grandmother    • Diabetes Maternal Grandfather    • Cancer Maternal Grandfather    • Cancer Biological Mother    • Hypertension Biological Mother        REVIEW OF SYSTEMS  16 point review of systems is as above and otherwise negative in detail.    VITAL SIGNS  Temp:  [36.8 °C (98.3 °F)-36.9 °C (98.4 °F)] 36.8 °C (98.3 °F)  Heart Rate:  [] 63  Resp:  [16-18] 16  BP: (111-149)/(60-93) 124/68    No intake or output data in the 24 hours ending 12/12/23 1819    PHYSICAL EXAM  Constitutional: She appears well-developed and well-nourished. No acute distress.   HEENT: Normocephalic, atraumatic.  Abdomen: gravid  Extremities: No edema.  Neurological: Grossly nonfocal.  She is oriented to person, place, and time.  Skin: Skin is warm and dry.  No rash.  Hematologic: No petechiae, purpura, or echymoses.    LAB RESULTS  CBC  Results from last 7 days   Lab Units 12/12/23  1329   WBC K/uL 8.52   RBC M/uL 4.79   HEMOGLOBIN g/dL 13.8   HEMATOCRIT % 42.6   MCV fL 88.9   PLATELETS K/uL 160     Diff      Chemistry     Coag      Micro  Microbiology Results     ** No results found for the last 720 hours. **          IMAGING  No results found.    PATHOLOGY  Pathology Results     **  "No results found for the last 2160 hours. **           ASSESSMENT & PLAN    29 yo with h/o \"mild\" von Willebrand's but no other details available in Epic record.  Prior vaginal delivery without complication without DDAVP or other treatment.  Presumably she has type 1 disease (most common, least severe) which usually requires no therapy in pregnancy as von Willebrand antigen levels usually go up 2-3 fold and therefore are usually sufficient for vaginal delivery/C section/neuraxial anesthesia (for all of which it is recommended that levels be >50 IU/dL). However, levels can drop precipitously post delivery and risk of post partum hemorrhage is increased.  Her baseline and pregnancy levels are unknown.   Levels can potentially be increased by giving DDAVP at time of delivery (usually after cord is clamped,  intravenously, 0.3 ?g/kg diluted in  mL saline and infused over 30 minutes) though we do not have prior confirmation of response to DDAVP.  Can also use von Willebrand factor concentrates in event of bleeding.   Would recommend tranexamic acid post delivery, can be oral or intravenous (usual oral dose, 25 mg/kg three times daily for 10 to 14 days).     Do recommend checking von Willebrand panel now to see where levels are (though will likely not be back in time to act on).  Also, patient should follow up with hematology >6 weeks post delivery to ascertain baseline levels and sub type of von Willebrands disease.  [place] is also at risk for von Willebrand's.      All of the above has been reviewed with the patient, who is in agreement with the plan of action.  I appreciate the opportunity to participate in the care of this patient, who I will follow with you.        Queenie Garay MD      "

## 2023-12-13 PROBLEM — Z34.90 ENCOUNTER FOR PLANNED INDUCTION OF LABOR: Status: RESOLVED | Noted: 2023-12-12 | Resolved: 2023-12-13

## 2023-12-13 PROBLEM — E66.9 OBESITY: Status: RESOLVED | Noted: 2023-12-12 | Resolved: 2023-12-13

## 2023-12-13 PROCEDURE — 12000000 HC ROOM AND CARE MED/SURG

## 2023-12-13 PROCEDURE — 63700000 HC SELF-ADMINISTRABLE DRUG: Performed by: OBSTETRICS & GYNECOLOGY

## 2023-12-13 RX ORDER — CALCIUM CARBONATE 200(500)MG
500 TABLET,CHEWABLE ORAL EVERY 4 HOURS PRN
Status: DISCONTINUED | OUTPATIENT
Start: 2023-12-13 | End: 2023-12-14 | Stop reason: HOSPADM

## 2023-12-13 RX ORDER — METOCLOPRAMIDE HYDROCHLORIDE 5 MG/ML
10 INJECTION INTRAMUSCULAR; INTRAVENOUS EVERY 6 HOURS PRN
Status: DISCONTINUED | OUTPATIENT
Start: 2023-12-13 | End: 2023-12-14 | Stop reason: HOSPADM

## 2023-12-13 RX ORDER — AMOXICILLIN 250 MG
1 CAPSULE ORAL 2 TIMES DAILY
Status: DISCONTINUED | OUTPATIENT
Start: 2023-12-13 | End: 2023-12-14 | Stop reason: HOSPADM

## 2023-12-13 RX ORDER — ACETAMINOPHEN 325 MG/1
650 TABLET ORAL EVERY 4 HOURS PRN
Status: DISCONTINUED | OUTPATIENT
Start: 2023-12-13 | End: 2023-12-14 | Stop reason: HOSPADM

## 2023-12-13 RX ORDER — DIPHENHYDRAMINE HCL 25 MG
25 CAPSULE ORAL EVERY 6 HOURS PRN
Status: DISCONTINUED | OUTPATIENT
Start: 2023-12-13 | End: 2023-12-14 | Stop reason: HOSPADM

## 2023-12-13 RX ORDER — METOCLOPRAMIDE 10 MG/1
10 TABLET ORAL EVERY 6 HOURS PRN
Status: DISCONTINUED | OUTPATIENT
Start: 2023-12-13 | End: 2023-12-14 | Stop reason: HOSPADM

## 2023-12-13 RX ORDER — DIPHENHYDRAMINE HCL 50 MG/ML
25 VIAL (ML) INJECTION EVERY 6 HOURS PRN
Status: DISCONTINUED | OUTPATIENT
Start: 2023-12-13 | End: 2023-12-14 | Stop reason: HOSPADM

## 2023-12-13 RX ORDER — DIBUCAINE 1 %
1 OINTMENT (GRAM) TOPICAL AS NEEDED
Status: DISCONTINUED | OUTPATIENT
Start: 2023-12-13 | End: 2023-12-14 | Stop reason: HOSPADM

## 2023-12-13 RX ORDER — ALUMINUM HYDROXIDE, MAGNESIUM HYDROXIDE, AND SIMETHICONE 1200; 120; 1200 MG/30ML; MG/30ML; MG/30ML
30 SUSPENSION ORAL EVERY 4 HOURS PRN
Status: DISCONTINUED | OUTPATIENT
Start: 2023-12-13 | End: 2023-12-14 | Stop reason: HOSPADM

## 2023-12-13 RX ORDER — IBUPROFEN 600 MG/1
600 TABLET ORAL
Status: DISCONTINUED | OUTPATIENT
Start: 2023-12-13 | End: 2023-12-14 | Stop reason: HOSPADM

## 2023-12-13 RX ORDER — IBUPROFEN 600 MG/1
600 TABLET ORAL
Qty: 40 TABLET | Refills: 0 | Status: SHIPPED | OUTPATIENT
Start: 2023-12-13 | End: 2024-03-26

## 2023-12-13 RX ADMIN — IBUPROFEN 600 MG: 600 TABLET, FILM COATED ORAL at 19:59

## 2023-12-13 RX ADMIN — IBUPROFEN 600 MG: 600 TABLET, FILM COATED ORAL at 14:24

## 2023-12-13 RX ADMIN — IBUPROFEN 600 MG: 600 TABLET, FILM COATED ORAL at 08:13

## 2023-12-13 RX ADMIN — SENNOSIDES AND DOCUSATE SODIUM 1 TABLET: 8.6; 5 TABLET ORAL at 08:13

## 2023-12-13 RX ADMIN — SENNOSIDES AND DOCUSATE SODIUM 1 TABLET: 8.6; 5 TABLET ORAL at 19:59

## 2023-12-13 RX ADMIN — PRENATAL VITAMINS-IRON FUMARATE 27 MG IRON-FOLIC ACID 0.8 MG TABLET 1 TABLET: at 08:13

## 2023-12-13 RX ADMIN — IBUPROFEN 600 MG: 600 TABLET, FILM COATED ORAL at 02:14

## 2023-12-13 ASSESSMENT — PAIN SCALES - GENERAL: PAIN_LEVEL: 0

## 2023-12-13 NOTE — PROGRESS NOTES
"   Hematology/Oncology Progress Note       SUBJECTIVE  Seen this morning, delivery went well.  Son at bedside.  Reasonable amount of bleeding.      CURRENT MEDS  Current Facility-Administered Medications   Medication Dose Route Frequency   • acetaminophen  650 mg oral q4h PRN   • calcium carbonate  500 mg oral q4h PRN    And   • alum-mag hydroxide-simeth  30 mL oral q4h PRN   • benzocaine-menthoL   Topical PRN   • dibucaine  1 Application Topical PRN   • diphenhydrAMINE  25 mg oral q6h PRN    Or   • diphenhydrAMINE  25 mg intravenous q6h PRN   • ibuprofen  600 mg oral q6h INT   • metoclopramide  10 mg oral q6h PRN    Or   • metoclopramide  10 mg intravenous q6h PRN   • prenatal vit no.130-iron-folic  1 tablet oral Daily   • sennosides-docusate sodium  1 tablet oral BID   • witch hazeL   Topical PRN       VITAL SIGNS  Temp:  [36.4 °C (97.5 °F)-36.9 °C (98.4 °F)] 36.9 °C (98.4 °F)  Heart Rate:  [] 72  Resp:  [16-18] 18  BP: (105-177)/(54-89) 131/80    Intake/Output Summary (Last 24 hours) at 12/13/2023 1633  Last data filed at 12/12/2023 2252  Gross per 24 hour   Intake --   Output 719 ml   Net -719 ml       PHYSICAL EXAM  Constitutional:  Comfortable appearing, in no acute distress.     LAB RESULTS  CBC  Results from last 7 days   Lab Units 12/12/23  1329   WBC K/uL 8.52   RBC M/uL 4.79   HEMOGLOBIN g/dL 13.8   HEMATOCRIT % 42.6   MCV fL 88.9   PLATELETS K/uL 160     Diff      Chemistry     Coag      Micro  Microbiology Results     ** No results found for the last 720 hours. **        IMAGING  No results found.    ASSESSMENT & PLAN  27 yo with h/o \"mild\" von Willebrand's but no other details available in Epic record.  Prior vaginal delivery without complication without DDAVP or other treatment.  Presumably she has type 1 disease (most common, least severe).  Normal vaginal delivery last evening, went well, no excessive bleeding noted.     Von Willebrand panel pending.  Assuming she actually has von Willebrand's " (which won't be confirmed or ruled out but her pending von Willebrand panel given elevation of levels during pregnancy, but they can fall quickly post delivery), then she is at increased risk for post partum hemorrhage.  Could consider tranexamic acid as stated previously.  Did discuss with patient von Willebrand's is genetic disease so her son is at risk, levels usually elevated at birth but can lead to risk of bleeding.  Son scheduled for circumcision today.      Discussed with her recommended seeing hematologist > 6 weeks post op, would be good to get her baseline levels.      Queenie Garay MD

## 2023-12-13 NOTE — DISCHARGE INSTRUCTIONS
"After Your Delivery Discharge Instructions    After Discharge Orders:  You should call the office to be seen for your postpartum visit in 6 weeks. The office number is [phone]    Medications at home:  You may take ibuprofen 400 mg - 600 mg every 6 hours as needed for pain.   You may also take Tylenol 650 mg every 6 hours.   If you experience constipation, you can take stool softeners like Colace or a laxative such as Miralax.         Call the Physician with any signs or symptoms:       After your delivery - signs and symptoms to watch for:  Fever - Oral temperature greater than 100.4 degrees Fahrenheit  Foul-smelling vaginal discharge  Headache unrelieved by \"pain meds\"  Difficulty urinating  Breasts reddened, hard, hot to the touch  Nipple discharge which is foul-smelling or contains pus  Increased pain at the site of the surgical incision or tear  Difficulty breathing with or without chest pain  New calf pain especially if only on one side  Sudden, continuing increased vaginal bleeding with or without clots  Unrelieved feelings of:  Inability to cope  Sadness  Anxiety  Lack of interest in baby  Insomnia  Crying     What to do at home:  See patient education handouts for full information  You may take sitz baths for comfort, pat your bottom dry if you have stitches. Use gentle, unscented soap  Resume activity gradually   Don't lift anything heavier than baby and carrier until OK'd by your Physician  No sex for 6 weeks  Take care of yourself by sleeping/resting as much as possible  Eat regular nutritious meals  Let someone else care for you, your baby, and housework as much as possible   Take pain medication as prescribed whenever you need them  Wear compression stockings if prescribed   To avoid/relieve constipation take stool softeners if advised   Drink lots of water/fruit juices  Increase fiber in your diet  Breast care: Wear support bra 24/7; use lanolin ointment/cream, nipple shields or cool compresses as " needed     Refer to  Discharge Instructions for problems or follow-up regarding  feeding or nursing

## 2023-12-13 NOTE — ANESTHESIA POSTPROCEDURE EVALUATION
Patient: Karen Sprague    Procedure Summary     Date: 12/12/23 Room / Location:     Anesthesia Start: 1744 Anesthesia Stop: 1947    Procedure: Labor Analgesia Diagnosis:     Scheduled Providers:  Responsible Provider: Adalid Greene MD    Anesthesia Type: labor epidural ASA Status: 2          Anesthesia Type: labor epidural  PACU Vitals    No data found in the last 10 encounters.           Anesthesia Post Evaluation    Pain score: 0  Pain management: adequate  Patient location during evaluation: PACU  Patient participation: complete - patient participated  Level of consciousness: awake and alert  Cardiovascular status: acceptable  Airway Patency: adequate  Respiratory status: acceptable  Hydration status: acceptable  Anesthetic complications: no

## 2023-12-13 NOTE — PROGRESS NOTES
Obstetrics Postpartum Progress Note  PP1 s/p     Events  No acute events overnight.    Subjective  Pain: Controlled with medications  Bleeding: lochia moderate  Diet: taking regular diet  Voiding: without difficulty  Ambulating: as tolerated    Vitals  Temp:  [36.4 °C (97.5 °F)-36.9 °C (98.4 °F)] 36.4 °C (97.5 °F)  Heart Rate:  [] 77  Resp:  [16-18] 18  BP: (105-177)/(54-93) 124/88    I&O    Intake/Output Summary (Last 24 hours) at 2023 1030  Last data filed at 2023 2252  Gross per 24 hour   Intake --   Output 719 ml   Net -719 ml       Physical Exam  General: well  Heart: Regular rate and rhythm  Lungs: Unlabored breathing  Abdomen: soft, nondistended, non-tender  Fundus: firm and below umbilicus  Perineum: deferred  Extremities: symmetric and no edema    Labs  Labs Reviewed:  Lab Results   Component Value Date    ABO B 2023    LABRH Positive 2023      Rubella: immune      Problem-based Assessment and Plan    Karen Sprague is a 28 y.o.  postpartum day 1 s/p Vaginal, Spontaneous .    1. Vital Signs: stable  2. Hemodynamics: stable  3. Pain: controlled  4. VTE Assessment: Early Ambulation  5. Vaccinations/Rhogam: rhogam not indicated   6. Post care: meeting all goals     Anticipate discharge on PPD#2. Circ consent obtained    Zunilda Aguilar MD

## 2023-12-13 NOTE — DISCHARGE SUMMARY
Inpatient Discharge Summary    BRIEF OVERVIEW  Discharge Provider: Zunilda Aguilar MD  Primary care provider: Lucinda Ernst DO    Admission Date: 2023     Discharge Date: 23    Discharge Diagnosis  Postpartum state    Discharge Disposition  Home    Discharge Medications     Medication List      START taking these medications    ibuprofen 600 mg tablet  Commonly known as: MOTRIN  Take 1 tablet (600 mg total) by mouth every 6 (six) hours for 10 days.  Dose: 600 mg        CONTINUE taking these medications    prenatal vit no.130-iron-folic 27 mg iron- 800 mcg tablet tablet  Take 1 tablet by mouth daily.  Dose: 1 tablet        STOP taking these medications    magnesium     SLOW  mg (45 mg iron) tablet extended release tablet, extended release  Generic drug: ferrous sulfate            Outpatient Follow-Up  Postpartum followup in 6 weeks        DETAILS OF HOSPITAL STAY      Karen Sprague is a  who presented to L&D and had Labor/Delivery: Uncomplicated vaginal delivery. She had a routine postpartum course and was discharged in stable condition. Discharge instructions were reviewed in detail.     She will follow up in 6 weeks.     Relevant consults: none  Relevant labs: none

## 2023-12-13 NOTE — PLAN OF CARE
Plan of Care Review  Plan of Care Reviewed With: patient, spouse  Progress: improving  Outcome Evaluation: VSS> Bleeding wnl. Pain managed with Motrin. Caring for self and infant independently.

## 2023-12-13 NOTE — L&D DELIVERY NOTE
OB Vaginal Delivery Note    Delivery:2023 at 7:47 PM   Patient:Karen Sprague  :1995     Review the Delivery Report for details.     Delivery Details    Pre-Op Diagnosis: 1. 28 y.o.  intrauterine pregnancy at 40w2d with quinteros gestation.  2. Medical induction for non reactive NST in office   Post-Op Diagnosis: 1. Same and tight nuchal cord x 1 - cut on perineum   Delivery Clinician: Paulette Mascorro    Delivery Assist;Delivery Nurse;Nursery Nurse;  Purvi Bernabe;Radha Dawkins;Nette Nathan;Tiffani Solo    Delivery Type: Vaginal, Spontaneous    Labor Complications:     EBL: 200  mL   Anesthesia Type: Epidural    Placenta Delivery Spontaneous    Placenta Disposition: discarded    Additional Specimens None      INFANT INFORMATION  Time of Birth:7:47 PM   Presentation: Vertex   Position:Left ,Occiput ,Anterior   Cord: 3 vessels ,Complications:Nuchal    Sex: male    Weight:       1 Minute 5 Minute 10 Minute   Apgar Totals: 8   9          Information for the patient's :  Andres Sprague [[id number]]     [place] Cord Gas     None           Delivery Details:    Karen Sprague is a 28 y.o.  at 40w2d gestation who presented to the hospital for Encounter for planned induction of labor [Z34.90].  Her labor was induced  AROM and oxytocin.  The patient progressed to fully dilated and pushed for  hours and 12 minutes. A very tight nuchal cord was clamped and cut on the perineum.  A viable  male  infant was delivered by Vaginal, Spontaneous  from Left ,Occiput ,Anterior .  The shoulders delivered without difficulty. The baby was vigorous and placed on the mom's chest. The placenta delivered spontaneously, intact.  Fundal massage was performed and the fundus was found to be firm, and lochia was within normal limits. The perineum, vagina, cervix were inspected, and the following lacerations were noted:      Episiotomy: None    Lacerations:   Perineal: 2nd   Repaired: Yes     Periurethral:    Repaired:     Labial:   Repaired:     Sulcus:   Repaired:     Vaginal:   Repaired:     Cervical:    Repaired:        Any lacerations were repaired in the usual fashion using 2-0 Synthetic Suture  suture. Excellent hemostasis was noted. At the completion of the case, sponge and needle counts were correct. The infant and patient were left in the delivery room in stable condition.       Paulette Mascorro MD

## 2023-12-13 NOTE — PLAN OF CARE
Problem: Adult Inpatient Plan of Care  Goal: Plan of Care Review  Outcome: Progressing  Flowsheets (Taken 12/13/2023 2548)  Progress: improving  Outcome Evaluation: VSS. Bleeding WDL. Pain well controlled per MAR. Voiding and ambulating independently. Encouraged to complete paperwork for D/C tomorrow.  Plan of Care Reviewed With:   patient   spouse   Plan of Care Review  Plan of Care Reviewed With: patient, spouse  Progress: improving  Outcome Evaluation: VSS. Bleeding WDL. Pain well controlled per MAR. Voiding and ambulating independently. Encouraged to complete paperwork for D/C tomorrow.

## 2023-12-14 VITALS
HEART RATE: 61 BPM | DIASTOLIC BLOOD PRESSURE: 63 MMHG | HEIGHT: 66 IN | TEMPERATURE: 97.9 F | OXYGEN SATURATION: 98 % | BODY MASS INDEX: 32.47 KG/M2 | RESPIRATION RATE: 16 BRPM | WEIGHT: 202 LBS | SYSTOLIC BLOOD PRESSURE: 110 MMHG

## 2023-12-14 PROCEDURE — 63700000 HC SELF-ADMINISTRABLE DRUG: Performed by: OBSTETRICS & GYNECOLOGY

## 2023-12-14 RX ADMIN — IBUPROFEN 600 MG: 600 TABLET, FILM COATED ORAL at 05:06

## 2023-12-14 RX ADMIN — PRENATAL VITAMINS-IRON FUMARATE 27 MG IRON-FOLIC ACID 0.8 MG TABLET 1 TABLET: at 09:15

## 2023-12-14 RX ADMIN — SENNOSIDES AND DOCUSATE SODIUM 1 TABLET: 8.6; 5 TABLET ORAL at 09:15

## 2023-12-14 NOTE — PLAN OF CARE
Problem: Adult Inpatient Plan of Care  Goal: Plan of Care Review  Outcome: Progressing  Flowsheets (Taken 12/14/2023 4459)  Progress: improving  Outcome Evaluation: vss  Plan of Care Reviewed With: patient   Plan of Care Review  Plan of Care Reviewed With: patient  Progress: improving  Outcome Evaluation: vss

## 2023-12-14 NOTE — PLAN OF CARE
Problem: Adult Inpatient Plan of Care  Goal: Plan of Care Review  12/14/2023 0838 by Vanessa Holcomb RN  Outcome: Met   Plan of Care Review  Plan of Care Reviewed With: patient  Progress: improving  Outcome Evaluation: vss

## 2023-12-14 NOTE — PLAN OF CARE
Plan of Care Review  Plan of Care Reviewed With: patient  Progress: improving  Outcome Evaluation: VSS. Bleeding WNL. Pain well controlled with Motrin. Bonding appropriately with infant. Plan for d/c home today, encouraged to work on paperwork.

## 2023-12-19 LAB
APTT PPP: 29 SEC (ref 23–32)
COAG FACT INTRINSIC PPP-IMP: ABNORMAL
FACT VIII ACT/NOR PPP: 232 % NORMAL (ref 50–180)
VWF AG ACT/NOR PPP IA: 222 % (ref 50–217)
VWF MULTIMERS PPP QL: ABNORMAL
VWF:RCO ACT/NOR PPP PL AGG: 173 % NORMAL (ref 42–200)

## 2024-02-08 ENCOUNTER — TRANSCRIBE ORDERS (OUTPATIENT)
Dept: SCHEDULING | Age: 29
End: 2024-02-08

## 2024-02-08 DIAGNOSIS — N63.20 UNSPECIFIED LUMP IN THE LEFT BREAST, UNSPECIFIED QUADRANT: Primary | ICD-10-CM

## 2024-02-22 ENCOUNTER — HOSPITAL ENCOUNTER (OUTPATIENT)
Dept: RADIOLOGY | Facility: CLINIC | Age: 29
Discharge: HOME | End: 2024-02-22
Attending: OBSTETRICS & GYNECOLOGY
Payer: COMMERCIAL

## 2024-02-22 DIAGNOSIS — N63.20 UNSPECIFIED LUMP IN THE LEFT BREAST, UNSPECIFIED QUADRANT: ICD-10-CM

## 2024-02-22 PROCEDURE — G0279 TOMOSYNTHESIS, MAMMO: HCPCS

## 2024-02-22 PROCEDURE — 76642 ULTRASOUND BREAST LIMITED: CPT | Mod: LT

## 2024-02-22 PROCEDURE — 77066 DX MAMMO INCL CAD BI: CPT

## 2024-03-22 ENCOUNTER — HOSPITAL ENCOUNTER (EMERGENCY)
Facility: HOSPITAL | Age: 29
Discharge: HOME | End: 2024-03-22
Attending: EMERGENCY MEDICINE
Payer: COMMERCIAL

## 2024-03-22 ENCOUNTER — APPOINTMENT (EMERGENCY)
Dept: RADIOLOGY | Facility: HOSPITAL | Age: 29
End: 2024-03-22
Payer: COMMERCIAL

## 2024-03-22 VITALS
SYSTOLIC BLOOD PRESSURE: 150 MMHG | DIASTOLIC BLOOD PRESSURE: 75 MMHG | TEMPERATURE: 97.4 F | HEART RATE: 85 BPM | RESPIRATION RATE: 18 BRPM | OXYGEN SATURATION: 97 %

## 2024-03-22 DIAGNOSIS — F41.9 ANXIETY: ICD-10-CM

## 2024-03-22 DIAGNOSIS — R07.9 CHEST PAIN, UNSPECIFIED TYPE: Primary | ICD-10-CM

## 2024-03-22 LAB
ALBUMIN SERPL-MCNC: 4.7 G/DL (ref 3.5–5.7)
ALP SERPL-CCNC: 74 IU/L (ref 34–125)
ALT SERPL-CCNC: 25 IU/L (ref 7–52)
ANION GAP SERPL CALC-SCNC: 5 MEQ/L (ref 3–15)
AST SERPL-CCNC: 20 IU/L (ref 13–39)
BASOPHILS # BLD: 0.08 K/UL (ref 0.01–0.1)
BASOPHILS NFR BLD: 1 %
BILIRUB SERPL-MCNC: 0.3 MG/DL (ref 0.3–1.2)
BUN SERPL-MCNC: 14 MG/DL (ref 7–25)
CALCIUM SERPL-MCNC: 9.2 MG/DL (ref 8.6–10.3)
CHLORIDE SERPL-SCNC: 106 MEQ/L (ref 98–107)
CO2 SERPL-SCNC: 26 MEQ/L (ref 21–31)
CREAT SERPL-MCNC: 0.9 MG/DL (ref 0.6–1.2)
DIFFERENTIAL METHOD BLD: ABNORMAL
EGFRCR SERPLBLD CKD-EPI 2021: >60 ML/MIN/1.73M*2
EOSINOPHIL # BLD: 0.14 K/UL (ref 0.04–0.36)
EOSINOPHIL NFR BLD: 1.8 %
ERYTHROCYTE [DISTWIDTH] IN BLOOD BY AUTOMATED COUNT: 12.3 % (ref 11.7–14.4)
GLUCOSE SERPL-MCNC: 93 MG/DL (ref 70–99)
HCG SERPL-ACNC: <0.6 IU/L (MIU/ML)
HCT VFR BLD AUTO: 42.6 % (ref 35–45)
HGB BLD-MCNC: 13.6 G/DL (ref 11.8–15.7)
IMM GRANULOCYTES # BLD AUTO: 0.02 K/UL (ref 0–0.08)
IMM GRANULOCYTES NFR BLD AUTO: 0.3 %
LYMPHOCYTES # BLD: 2.14 K/UL (ref 1.2–3.5)
LYMPHOCYTES NFR BLD: 28 %
MCH RBC QN AUTO: 28.6 PG (ref 28–33.2)
MCHC RBC AUTO-ENTMCNC: 31.9 G/DL (ref 32.2–35.5)
MCV RBC AUTO: 89.7 FL (ref 83–98)
MONOCYTES # BLD: 0.41 K/UL (ref 0.28–0.8)
MONOCYTES NFR BLD: 5.4 %
NEUTROPHILS # BLD: 4.86 K/UL (ref 1.7–7)
NEUTS SEG NFR BLD: 63.5 %
NRBC BLD-RTO: 0 %
PDW BLD AUTO: 9.8 FL (ref 9.4–12.3)
PLATELET # BLD AUTO: 328 K/UL (ref 150–369)
POTASSIUM SERPL-SCNC: 4.1 MEQ/L (ref 3.5–5.1)
PROT SERPL-MCNC: 7.6 G/DL (ref 6–8.2)
RBC # BLD AUTO: 4.75 M/UL (ref 3.93–5.22)
SODIUM SERPL-SCNC: 137 MEQ/L (ref 136–145)
TROPONIN I SERPL HS-MCNC: <2 PG/ML
WBC # BLD AUTO: 7.65 K/UL (ref 3.8–10.5)

## 2024-03-22 PROCEDURE — 93005 ELECTROCARDIOGRAM TRACING: CPT | Performed by: EMERGENCY MEDICINE

## 2024-03-22 PROCEDURE — 84702 CHORIONIC GONADOTROPIN TEST: CPT | Performed by: PHYSICIAN ASSISTANT

## 2024-03-22 PROCEDURE — 85025 COMPLETE CBC W/AUTO DIFF WBC: CPT | Performed by: EMERGENCY MEDICINE

## 2024-03-22 PROCEDURE — 99283 EMERGENCY DEPT VISIT LOW MDM: CPT | Mod: 25,U5

## 2024-03-22 PROCEDURE — 36415 COLL VENOUS BLD VENIPUNCTURE: CPT | Performed by: EMERGENCY MEDICINE

## 2024-03-22 PROCEDURE — 80053 COMPREHEN METABOLIC PANEL: CPT | Performed by: EMERGENCY MEDICINE

## 2024-03-22 PROCEDURE — 84484 ASSAY OF TROPONIN QUANT: CPT | Performed by: EMERGENCY MEDICINE

## 2024-03-22 PROCEDURE — 71046 X-RAY EXAM CHEST 2 VIEWS: CPT

## 2024-03-22 ASSESSMENT — ENCOUNTER SYMPTOMS
DIZZINESS: 0
LOWER EXTREMITY EDEMA: 0
DIAPHORESIS: 0
COUGH: 0
VOMITING: 0
BACK PAIN: 1
ABDOMINAL PAIN: 0
WEAKNESS: 0
SHORTNESS OF BREATH: 0
NUMBNESS: 1
TROUBLE SWALLOWING: 0
FEVER: 0
NAUSEA: 0
HEADACHES: 0

## 2024-03-22 NOTE — ED PROVIDER NOTES
Emergency Medicine Note  HPI   HISTORY OF PRESENT ILLNESS     28-year-old female with no significant past medical history presents to the emergency department stating that since she had her child in December she has been having intermittent left-sided chest pain.  She states it was a vaginal delivery with no complications.  She had an epidural.  She states today she was at her grandmother's house and she felt numbness in the left side of her chest radiating up her neck and felt as though her tongue was swollen.  She states this resolved but she still feels numbness in the back of her tongue.  She denies numbness or tingling to the fingertips.  She states she has been feeling anxious lately.  She has also been having some pain in her low back.  She denies current pregnancy.  She denies drug use or alcohol use.  She does not have a history of DVT or pulmonary embolism.  She denies recent travel.  She denies recent surgery.  She is not on oral contraceptives.  She denies fever or cough.  She denies sore throat.      History provided by:  Patient   used: No    Chest Pain  Pain location:  L chest  Pain quality: aching    Pain radiates to:  Neck  Pain severity:  Mild  Onset quality:  Gradual  Duration:  3 months  Progression:  Waxing and waning  Chronicity:  New  Context: stress    Context: not breathing    Relieved by:  None tried  Worsened by:  Nothing  Ineffective treatments:  None tried  Associated symptoms: anxiety, back pain and numbness    Associated symptoms: no abdominal pain, no cough, no diaphoresis, no dizziness, no dysphagia, no fever, no headache, no lower extremity edema, no nausea, no shortness of breath, no vomiting and no weakness          Patient History   PAST HISTORY     Reviewed from Nursing Triage:       Past Medical History:   Diagnosis Date   • Abnormal Pap smear of cervix    • Blood disorder    • Gestational diabetes    • Obesity    • Von Willebrands disease (CMS/[facility])         Past Surgical History:   Procedure Laterality Date   • FOOT SURGERY      ingrown toenails   • TONSILLECTOMY     • WISDOM TOOTH EXTRACTION         Family History   Problem Relation Age of Onset   • Stroke Maternal Grandmother    • Diabetes Maternal Grandfather    • Cancer Maternal Grandfather    • Cancer Biological Mother    • Hypertension Biological Mother        Social History     Tobacco Use   • Smoking status: Never   • Smokeless tobacco: Never   Substance Use Topics   • Alcohol use: Never   • Drug use: Never         Review of Systems   REVIEW OF SYSTEMS     Review of Systems   Constitutional: Negative for diaphoresis and fever.   HENT: Negative for trouble swallowing.    Respiratory: Negative for cough and shortness of breath.    Cardiovascular: Positive for chest pain.   Gastrointestinal: Negative for abdominal pain, nausea and vomiting.   Musculoskeletal: Positive for back pain.   Neurological: Positive for numbness. Negative for dizziness, weakness and headaches.   All other systems reviewed and are negative.        VITALS     ED Vitals    Date/Time Temp Pulse Resp BP SpO2 [facility]   03/22/24 1830 36.3 °C (97.4 °F) 85 18 150/75 97 % JLG        Pulse Ox %: 97 % (03/22/24 1923)  Pulse Ox Interpretation: Normal (03/22/24 1923)           Physical Exam   PHYSICAL EXAM     Physical Exam  Vitals and nursing note reviewed.   Constitutional:       Appearance: She is well-developed.      Comments: Patient is anxious and tearful   Cardiovascular:      Rate and Rhythm: Normal rate and regular rhythm.      Heart sounds: Normal heart sounds.   Pulmonary:      Effort: Pulmonary effort is normal.      Breath sounds: Normal breath sounds.   Chest:      Chest wall: No tenderness.   Abdominal:      General: Bowel sounds are normal.      Palpations: Abdomen is soft.   Musculoskeletal:         General: Normal range of motion.      Cervical back: Normal range of motion and neck supple.      Right lower leg: No tenderness. No  edema.      Left lower leg: No tenderness. No edema.   Skin:     General: Skin is warm and dry.      Capillary Refill: Capillary refill takes less than 2 seconds.      Findings: No rash.   Neurological:      General: No focal deficit present.      Mental Status: She is alert.   Psychiatric:         Mood and Affect: Mood is anxious.           PROCEDURES     Procedures     DATA     Results     Procedure Component Value Units Date/Time    Comprehensive metabolic panel [[id number]]  (Normal) Collected: 03/22/24 1832    Specimen: Blood, Venous Updated: 03/22/24 1939     Sodium 137 mEQ/L      Potassium 4.1 mEQ/L      Comment: Results obtained on plasma. Plasma Potassium values may be up to 0.4 mEQ/L less than serum values. The differences may be greater for patients with high platelet or white cell counts.        Chloride 106 mEQ/L      CO2 26 mEQ/L      BUN 14 mg/dL      Creatinine 0.9 mg/dL      Glucose 93 mg/dL      Calcium 9.2 mg/dL      AST (SGOT) 20 IU/L      ALT (SGPT) 25 IU/L      Alkaline Phosphatase 74 IU/L      Total Protein 7.6 g/dL      Comment: Test performed on plasma which typically contains approximately 0.4 g/dL more protein than serum.        Albumin 4.7 g/dL      Bilirubin, Total 0.3 mg/dL      eGFR >60.0 mL/min/1.73m*2      Comment: Calculation based on the Chronic Kidney Disease Epidemiology Collaboration (CKD-EPI) equation refit without adjustment for race.        Anion Gap 5 mEQ/L     HS Troponin I (with 2 hour reflex) [[id number]]  (Normal) Collected: 03/22/24 1832    Specimen: Blood, Venous Updated: 03/22/24 1927     High Sens Troponin I <2.0 pg/mL     BhCG, Serum, Quant [[id number]]  (Normal) Collected: 03/22/24 1832    Specimen: Blood, Venous Updated: 03/22/24 1925     hCG Quant <0.6 IU/L (mIU/mL)     CBC and differential [[id number]]  (Abnormal) Collected: 03/22/24 1832    Specimen: Blood, Venous Updated: 03/22/24 1850     WBC 7.65 K/uL      RBC 4.75 M/uL      Hemoglobin 13.6 g/dL      Hematocrit  42.6 %      MCV 89.7 fL      MCH 28.6 pg      MCHC 31.9 g/dL      RDW 12.3 %      Platelets 328 K/uL      MPV 9.8 fL      Differential Type Auto     nRBC 0.0 %      Immature Granulocytes 0.3 %      Neutrophils 63.5 %      Lymphocytes 28.0 %      Monocytes 5.4 %      Eosinophils 1.8 %      Basophils 1.0 %      Immature Granulocytes, Absolute 0.02 K/uL      Neutrophils, Absolute 4.86 K/uL      Lymphocytes, Absolute 2.14 K/uL      Monocytes, Absolute 0.41 K/uL      Eosinophils, Absolute 0.14 K/uL      Basophils, Absolute 0.08 K/uL     RAINBOW RED [[id number]] Collected: 03/22/24 1832    Specimen: Blood, Venous Updated: 03/22/24 1843    [place] Draw Panel [[id number]] Collected: 03/22/24 1832    Specimen: Blood, Venous Updated: 03/22/24 1843    Narrative:      The following orders were created for panel order [place] Draw Panel.  Procedure                               Abnormality         Status                     ---------                               -----------         ------                     RAINBOW RED[[id number]]                                      In process                 RAINBOW LT BLUE[[id number]]                                  In process                 RAINBOW GOLD[[id number]]                                     In process                   Please view results for these tests on the individual orders.    RAINBOW LT BLUE [[id number]] Collected: 03/22/24 1832    Specimen: Blood, Venous Updated: 03/22/24 1843    RAINBOW GOLD [[id number]] Collected: 03/22/24 1832    Specimen: Blood, Venous Updated: 03/22/24 1843          Imaging Results          X-RAY CHEST 2 VIEWS (Final result)  Result time 03/22/24 19:25:15    Final result                 Impression:    IMPRESSION: See comment.               Narrative:    CLINICAL HISTORY: Chest pain    COMPARISON:None    COMMENT: 2 views the chest are obtained.    There is no pneumothorax, pleural effusion or focal consolidation. The  cardiopericardial silhouette is normal  in size. Overlying soft tissues are  normal.                                ECG 12 lead   Independent Interpretation by ED Provider   EKG performed at 1830 showing a normal sinus rhythm at a rate of 99.  Normal EKG.          Scoring tools                                  ED Course & MDM   MDM / ED COURSE / CLINICAL IMPRESSION / DISPO     Medical Decision Making  28-year-old female presents to the emergency department complaining of chest pain and numbness to the left side of her chest and neck.  She has been under a lot of stress recently.  She states she just had a baby in December.  She also states that she took Plan B over the weekend because the condom broke.  She also happened to mention that her mother-in-law passed away a couple of days ago.  They have been very stressed.  Her workup is reassuring.  Her PERC test was negative.  Troponin normal, normal EKG, normal chest x-ray.  I offered her anxiety medication however she states she is using herbal supplements to try to help with the anxiety.  I offered her outpatient resources but she would prefer to follow-up with her family doctor.  She denies suicidal ideation.  Advised to return for worsening symptoms.  Patient and  comfortable with plan.    Anxiety: acute illness or injury  Chest pain, unspecified type: acute illness or injury  Amount and/or Complexity of Data Reviewed  Labs: ordered. Decision-making details documented in ED Course.  Radiology: ordered. Decision-making details documented in ED Course.  ECG/medicine tests: ordered and independent interpretation performed. Decision-making details documented in ED Course.      Risk  OTC drugs.          ED Course as of 03/22/24 2001   Fri Mar 22, 2024   1917 PERC score negative [CB]   1927 X-RAY CHEST 2 VIEWS  COMMENT: 2 views the chest are obtained.     There is no pneumothorax, pleural effusion or focal consolidation. The  cardiopericardial silhouette is normal in size. Overlying soft tissues  are  normal. [CB]      ED Course User Index  [CB] Joan Cotter PA     Clinical Impression      Chest pain, unspecified type   Anxiety     _________________     ED Disposition   Discharge                   Joan Cotter PA  03/22/24 2001

## 2024-03-22 NOTE — DISCHARGE INSTRUCTIONS
Your workup is reassuring.  Your chest x-ray is clear, your EKG is normal.  Your enzyme for your heart is normal.  I suspect that your symptoms may be related to anxiety.  Please follow closely with your family doctor.  Return to the emergency department for any worsening symptoms.

## 2024-03-23 ENCOUNTER — APPOINTMENT (EMERGENCY)
Dept: RADIOLOGY | Facility: HOSPITAL | Age: 29
End: 2024-03-23
Payer: COMMERCIAL

## 2024-03-23 ENCOUNTER — HOSPITAL ENCOUNTER (EMERGENCY)
Facility: HOSPITAL | Age: 29
Discharge: HOME | End: 2024-03-23
Attending: EMERGENCY MEDICINE
Payer: COMMERCIAL

## 2024-03-23 VITALS
OXYGEN SATURATION: 99 % | DIASTOLIC BLOOD PRESSURE: 81 MMHG | HEIGHT: 66 IN | SYSTOLIC BLOOD PRESSURE: 127 MMHG | TEMPERATURE: 98.1 F | RESPIRATION RATE: 18 BRPM | BODY MASS INDEX: 30.53 KG/M2 | HEART RATE: 70 BPM | WEIGHT: 190 LBS

## 2024-03-23 DIAGNOSIS — R07.9 NONSPECIFIC CHEST PAIN: Primary | ICD-10-CM

## 2024-03-23 DIAGNOSIS — R20.2 FACIAL PARESTHESIA: ICD-10-CM

## 2024-03-23 LAB
ALBUMIN SERPL-MCNC: 5 G/DL (ref 3.5–5.7)
ALP SERPL-CCNC: 76 IU/L (ref 34–125)
ALT SERPL-CCNC: 25 IU/L (ref 7–52)
ANION GAP SERPL CALC-SCNC: 7 MEQ/L (ref 3–15)
AST SERPL-CCNC: 21 IU/L (ref 13–39)
ATRIAL RATE: 99
BASOPHILS # BLD: 0.05 K/UL (ref 0.01–0.1)
BASOPHILS NFR BLD: 0.6 %
BILIRUB SERPL-MCNC: 0.5 MG/DL (ref 0.3–1.2)
BUN SERPL-MCNC: 11 MG/DL (ref 7–25)
CALCIUM SERPL-MCNC: 9.9 MG/DL (ref 8.6–10.3)
CHLORIDE SERPL-SCNC: 104 MEQ/L (ref 98–107)
CO2 SERPL-SCNC: 26 MEQ/L (ref 21–31)
CREAT SERPL-MCNC: 0.9 MG/DL (ref 0.6–1.2)
CRP SERPL-MCNC: 1.6 MG/L
D DIMER PPP IA.FEU-MCNC: <0.27 UG/ML FEU (ref 0–0.5)
DIFFERENTIAL METHOD BLD: ABNORMAL
EGFRCR SERPLBLD CKD-EPI 2021: >60 ML/MIN/1.73M*2
EOSINOPHIL # BLD: 0.02 K/UL (ref 0.04–0.36)
EOSINOPHIL NFR BLD: 0.3 %
ERYTHROCYTE [DISTWIDTH] IN BLOOD BY AUTOMATED COUNT: 12.3 % (ref 11.7–14.4)
ERYTHROCYTE [SEDIMENTATION RATE] IN BLOOD BY WESTERGREN METHOD: 24 MM/HR
GLUCOSE SERPL-MCNC: 93 MG/DL (ref 70–99)
HCG SERPL-ACNC: <0.6 IU/L (MIU/ML)
HCT VFR BLD AUTO: 41.8 % (ref 35–45)
HGB BLD-MCNC: 13.4 G/DL (ref 11.8–15.7)
IMM GRANULOCYTES # BLD AUTO: 0.02 K/UL (ref 0–0.08)
IMM GRANULOCYTES NFR BLD AUTO: 0.3 %
LYMPHOCYTES # BLD: 1.59 K/UL (ref 1.2–3.5)
LYMPHOCYTES NFR BLD: 20 %
MCH RBC QN AUTO: 28.6 PG (ref 28–33.2)
MCHC RBC AUTO-ENTMCNC: 32.1 G/DL (ref 32.2–35.5)
MCV RBC AUTO: 89.1 FL (ref 83–98)
MONOCYTES # BLD: 0.28 K/UL (ref 0.28–0.8)
MONOCYTES NFR BLD: 3.5 %
NEUTROPHILS # BLD: 5.98 K/UL (ref 1.7–7)
NEUTS SEG NFR BLD: 75.3 %
NRBC BLD-RTO: 0 %
P AXIS: 99
PDW BLD AUTO: 9.7 FL (ref 9.4–12.3)
PLATELET # BLD AUTO: 327 K/UL (ref 150–369)
POTASSIUM SERPL-SCNC: 4 MEQ/L (ref 3.5–5.1)
PR INTERVAL: 120
PROT SERPL-MCNC: 7.9 G/DL (ref 6–8.2)
QRS DURATION: 82
QT INTERVAL: 346
QTC CALCULATION(BAZETT): 444
R AXIS: 71
RBC # BLD AUTO: 4.69 M/UL (ref 3.93–5.22)
SODIUM SERPL-SCNC: 137 MEQ/L (ref 136–145)
T WAVE AXIS: 51
TROPONIN I SERPL HS-MCNC: <2 PG/ML
VENTRICULAR RATE: 99
WBC # BLD AUTO: 7.94 K/UL (ref 3.8–10.5)

## 2024-03-23 PROCEDURE — 36415 COLL VENOUS BLD VENIPUNCTURE: CPT

## 2024-03-23 PROCEDURE — 84702 CHORIONIC GONADOTROPIN TEST: CPT

## 2024-03-23 PROCEDURE — 84484 ASSAY OF TROPONIN QUANT: CPT

## 2024-03-23 PROCEDURE — 71045 X-RAY EXAM CHEST 1 VIEW: CPT

## 2024-03-23 PROCEDURE — 70450 CT HEAD/BRAIN W/O DYE: CPT

## 2024-03-23 PROCEDURE — 87207 SMEAR SPECIAL STAIN: CPT

## 2024-03-23 PROCEDURE — 85379 FIBRIN DEGRADATION QUANT: CPT

## 2024-03-23 PROCEDURE — 86140 C-REACTIVE PROTEIN: CPT

## 2024-03-23 PROCEDURE — 93005 ELECTROCARDIOGRAM TRACING: CPT

## 2024-03-23 PROCEDURE — 87468 ANAPLSMA PHGCYTOPHLM AMP PRB: CPT

## 2024-03-23 PROCEDURE — 80053 COMPREHEN METABOLIC PANEL: CPT

## 2024-03-23 PROCEDURE — 99284 EMERGENCY DEPT VISIT MOD MDM: CPT | Mod: U5,25

## 2024-03-23 PROCEDURE — 85652 RBC SED RATE AUTOMATED: CPT

## 2024-03-23 PROCEDURE — 85025 COMPLETE CBC W/AUTO DIFF WBC: CPT

## 2024-03-23 PROCEDURE — 86618 LYME DISEASE ANTIBODY: CPT

## 2024-03-23 ASSESSMENT — VISUAL ACUITY: OU: 1

## 2024-03-23 NOTE — DISCHARGE INSTRUCTIONS
You were seen and evaluated in the emergency department today for chest pain and paresthesias.  Workup today was overall reassuring.    You may take Tylenol or Motrin as needed for pain as directed on the bottle.  Please do not exceed 3 g of Tylenol in 24 hours.  May also use over-the-counter lidocaine patches    Your tickborne panel takes several days to result.  Someone from the emergency department will call you if anything results positive.  You may also check your results on [employer].    Please follow-up with your PCP.  I have also attached the contact information for a neurologist as you may need further workup for your paresthesias    Make sure that you are getting plenty of rest and staying hydrated    Return to the emergency department with any worsening symptoms

## 2024-03-23 NOTE — ED ATTESTATION NOTE
ED ATTENDING ATTESTATION NOTE  3/23/2024, 3:14 PM    I supervised the care provided by the PA ([place]). We have discussed the case. I have personally seen and examined Karen Sprague.  I personally performed the key components of the encounter and have been involved in the care and medical decision making for this patient.    I reviewed and agree with the PA's assessment and plan of care, with any exceptions as documented below.      My focused history, examination, assessment, and plan of care of Karen Sprague is as follows:    Brief History:  The patient presents with L chest discomfort and L neck paresthesias occurring since  in Dec 2023. She was seen in the ED yesterday with similar symptoms and had a negative work-up (EKG, blood work, chest x-ray). Now stating that the paresthesias are now occurring in her L forehead as well.  MIL recently .     Vital Signs Review: Vital signs have been reviewed. The initial oxygen saturation is SpO2: 100 % on room air. Interpretation: normal  ED Vitals      Date/Time Temp Pulse Resp BP SpO2 [facility]   24 1459 -- 75 6 132/79 99 % FT   24 1313 -- -- -- 144/101 -- CBF   24 1312 36.8 °C (98.2 °F) 78 16 -- 100 % CBF          Focused Physical Exam:  Constitutional:    Comments: Appears in no acute distress  HENT:   Head: Normocephalic and atraumatic.   Cardiovascular:   Rate and Rhythm: Normal rate and regular rhythm.   Heart sounds: Normal heart sounds.   Pulmonary:   Effort: Pulmonary effort is normal. No respiratory distress.   Breath sounds: Normal breath sounds.   Neurological:   Mental Status: Alert and oriented to person, place, and time. Mental status is at baseline. Grossly nonfocal.    Assessment / Plan / MDM:  L chest discomfort and L neck paresthesias x 3 months in a pt. seen here yesterday for similar sx, now with subjective paresthesias on L forehead.  CBC, CMP, D-dimer, troponin, beta-hCG Qual, tickborne studies, CRP, ESR, EKG, CXR, CT head.   Reevaluate.     Medical Decision Making  Problems Addressed:  Facial paresthesia: acute illness or injury  Nonspecific chest pain: acute illness or injury    Amount and/or Complexity of Data Reviewed  External Data Reviewed: labs, radiology and notes.  Labs: ordered. Decision-making details documented in ED Course.  Radiology: ordered. Decision-making details documented in ED Course.  ECG/medicine tests: ordered and independent interpretation performed. Decision-making details documented in ED Course.    NOTE: The patient was seen during a time of increased patient volume, and increased boarding in ED which often surpasses departmental capacity.  These challenges may have contributed to lengthened stay in the ED, and a portion of the patient's management and initial evaluation may have been done while in the waiting room.  In addition, this document was created using dragon dictation software. There might be some typographical errors due to this technology.  H40           Janett Wren MD  03/26/24 2006

## 2024-03-23 NOTE — ED ATTESTATION NOTE
I reviewed and agree with physician assistant / nurse practitioner’s assessment and plan of care;.  We discussed the treatment plan for the patient.  I was immediately available for any questions regarding the care of the patient.     Claudia Woods MD  03/22/24 2016

## 2024-03-23 NOTE — ED PROVIDER NOTES
" Emergency Medicine Note  HPI   HISTORY OF PRESENT ILLNESS     Patient is presents emergency department for evaluation for chest pain and paresthesias.  States that after she gave birth to her son in December she has had left-sided chest/breast pain and reports that in January she had an ultrasound and mammogram that just showed dense tissue.  She states that she did breast-feed for short period of time and denies any history of mastitis.  She reports persistent left-sided chest pain and she was seen and evaluated here yesterday for the same complaint and was also having left anterior neck paresthesias.  Patient had unremarkable EKG, blood work, chest x-ray and was told to follow-up with PCP.  Today she states that the paresthesias have traveled to her left forehead that she feels as though her left forehead/eyebrow feels \"heavy\".  She denies any eye pain, difficulty with eye movements headaches, dizziness, lightheadedness.  She reports that her chest pain is intermittent and worse with deep inspiration.  She also reports some intermittent back pain over the last 2 days but states that she has been lifting her child quite frequently.  Patient reports taking a dose of Plan B on Sunday as she and her 's condom broke.  Otherwise, she denies any use of hormonal medications.  Patient reports vaginal bleeding starting yesterday.  Reports that she has only had 1 period postpartum that started on 3/6.  States that this feels ago regular period  Patient denies any current visual changes, recent falls or head injuries, recent Botox injection, difficulty speaking/walking/swallowing, or weakness or numbness in the extremities, calf swelling/tenderness.  Denies any recent tick exposure or long periods of time spent outside.  She does report a recent increase in stress as a few days ago her mother-in-law suddenly passed away.  Denies any recent surgeries/hospitalizations/long travel or personal or family history of blood " clots or clotting disorders          Patient History   PAST HISTORY     Reviewed from Nursing Triage:       Past Medical History:   Diagnosis Date    Abnormal Pap smear of cervix     Blood disorder     Gestational diabetes     Obesity     Von Willebrands disease (CMS/HCC)        Past Surgical History:   Procedure Laterality Date    FOOT SURGERY      ingrown toenails    TONSILLECTOMY      WISDOM TOOTH EXTRACTION         Family History   Problem Relation Age of Onset    Stroke Maternal Grandmother     Diabetes Maternal Grandfather     Cancer Maternal Grandfather     Cancer Biological Mother     Hypertension Biological Mother        Social History     Tobacco Use    Smoking status: Never    Smokeless tobacco: Never   Substance Use Topics    Alcohol use: Never    Drug use: Never         Review of Systems   REVIEW OF SYSTEMS     Review of Systems      VITALS     ED Vitals      Date/Time Temp Pulse Resp BP SpO2 [facility]   03/23/24 1600 36.7 °C (98.1 °F) 70 18 127/81 99 % JCB   03/23/24 1459 -- 75 -- 132/79 99 % FT   03/23/24 1459 -- -- 16 -- -- SLS   03/23/24 1313 -- -- -- 144/101 -- CBF   03/23/24 1312 36.8 °C (98.2 °F) 78 16 -- 100 % CBF                         Physical Exam   PHYSICAL EXAM     Physical Exam  Vitals and nursing note reviewed.   Constitutional:       General: She is not in acute distress.     Appearance: She is well-developed. She is not ill-appearing or toxic-appearing.   HENT:      Head: Normocephalic and atraumatic.      Comments: Facial sensation and otor movement intact bilaterally     Mouth/Throat:      Lips: Pink.      Mouth: Mucous membranes are moist.      Tongue: Tongue does not deviate from midline.      Pharynx: Oropharynx is clear.      Tonsils: No tonsillar exudate or tonsillar abscesses.   Eyes:      General: Lids are normal. Vision grossly intact. Gaze aligned appropriately.      Extraocular Movements: Extraocular movements intact.      Conjunctiva/sclera: Conjunctivae normal.      Pupils:  Pupils are equal, round, and reactive to light.      Comments: No lid lag. No temporal artery tenderness   Cardiovascular:      Rate and Rhythm: Normal rate and regular rhythm.      Pulses: Normal pulses.      Heart sounds: Normal heart sounds. No murmur heard.  Pulmonary:      Effort: Pulmonary effort is normal. No respiratory distress.      Breath sounds: Normal breath sounds.   Abdominal:      General: Abdomen is flat.      Palpations: Abdomen is soft.      Tenderness: There is no abdominal tenderness. There is no guarding or rebound.   Musculoskeletal:         General: No tenderness.      Cervical back: Full passive range of motion without pain, normal range of motion and neck supple. No swelling, deformity, rigidity, tenderness or crepitus. No pain with movement, spinous process tenderness or muscular tenderness. Normal range of motion.      Thoracic back: No swelling, deformity, spasms, tenderness or bony tenderness. Normal range of motion.      Lumbar back: No swelling, deformity, tenderness or bony tenderness. Normal range of motion.      Right lower leg: No tenderness. No edema.      Left lower leg: No tenderness. No edema.   Skin:     General: Skin is warm and dry.      Capillary Refill: Capillary refill takes less than 2 seconds.      Findings: No erythema or rash.   Neurological:      General: No focal deficit present.      Mental Status: She is alert and oriented to person, place, and time. Mental status is at baseline.      GCS: GCS eye subscore is 4. GCS verbal subscore is 5. GCS motor subscore is 6.      Cranial Nerves: Cranial nerves 2-12 are intact.      Sensory: Sensation is intact.      Motor: Motor function is intact.      Coordination: Coordination is intact.      Gait: Gait is intact.   Psychiatric:         Mood and Affect: Mood is anxious.           PROCEDURES     Procedures     DATA     Results       Procedure Component Value Units Date/Time    Anaplasma Phagocytophilum DNA, PCR [[id number]]  Collected: 03/23/24 1409    Specimen: Blood, Venous Updated: 03/26/24 1601     Anaplasma Phagocytophilum DNA, Ql Not Detected     Comment: This test was developed and its analytical performance  characteristics have been determined by [facility] [place], [place]. It has  not been cleared or approved by the U.S. Food and Drug  Administration. This assay has been validated pursuant  to the CLIA regulations and is used for clinical  purposes.              Narrative:      Performing Organization Information:      Site ID: [id number]      Name: [facility]      Address: [address] [place] [address]  [place] [address]      Director: Jerome Flynn M.D.,PhD    Lyme EIA reflex WB [[id number]]  (Normal) Collected: 03/23/24 1409    Specimen: Blood, Venous Updated: 03/25/24 1104     Lyme Ab 0.35 Ratio      Comment: Interpretation:  <=0.90           No Antibody Detected.  0.91 - 1.09:     Equivocal.  >=1.10           Positive.    An index of 1.10 or > is presumptive evidence of Lyme disease in patients with compatible symptoms. Absence of Lyme Antibody may not rule out Lyme disease when clinical symptoms are present. In cases of negative or equivocal results, repeat analysis in 2 to 4 weeks may be warranted.       Parasites, peripheral blood Blood, Venous [[id number]] Collected: 03/23/24 1409    Specimen: Blood, Venous Updated: 03/24/24 1219     Parasites, Peripheral Blood Smear --     Pathologist Interpretation No parasites seen.    Electronically signed by Zulay Solorzano MD on March 24, 2024 at 12:19 PM.    ESR ([place] [facility] [[id number]]  (Normal) Collected: 03/23/24 1409    Specimen: Blood, Venous Updated: 03/23/24 1736     Sed Rate 24 mm/hr     HS Troponin (with 2 hour reflex) [[id number]]  (Normal) Collected: 03/23/24 1409    Specimen: Blood, Venous Updated: 03/23/24 1458     High Sens Troponin I <2.0 pg/mL     BhCG, Serum, Quant [[id number]]  (Normal) Collected:  03/23/24 1409    Specimen: Blood, Venous Updated: 03/23/24 1445     hCG Quant <0.6 IU/L (mIU/mL)     D-dimer, quantitative [[id number]]  (Normal) Collected: 03/23/24 1409    Specimen: Blood, Venous Updated: 03/23/24 1438     D-Dimer, Quant <0.27 ug/mL FEU      Comment: The D-Dimer assay can be used as an aid in the diagnosis of DVT or PE. The test can not be used by itself to exclude DVT or PE. When used as a diagnostic aid, the cutoff value is the same as the reference range: <0.5 ug/ml FEU.       Comprehensive metabolic panel [[id number]]  (Normal) Collected: 03/23/24 1409    Specimen: Blood, Venous Updated: 03/23/24 1438     Sodium 137 mEQ/L      Potassium 4.0 mEQ/L      Comment: Results obtained on plasma. Plasma Potassium values may be up to 0.4 mEQ/L less than serum values. The differences may be greater for patients with high platelet or white cell counts.        Chloride 104 mEQ/L      CO2 26 mEQ/L      BUN 11 mg/dL      Creatinine 0.9 mg/dL      Glucose 93 mg/dL      Calcium 9.9 mg/dL      AST (SGOT) 21 IU/L      ALT (SGPT) 25 IU/L      Alkaline Phosphatase 76 IU/L      Total Protein 7.9 g/dL      Comment: Test performed on plasma which typically contains approximately 0.4 g/dL more protein than serum.        Albumin 5.0 g/dL      Bilirubin, Total 0.5 mg/dL      eGFR >60.0 mL/min/1.73m*2      Comment: Calculation based on the Chronic Kidney Disease Epidemiology Collaboration (CKD-EPI) equation refit without adjustment for race.        Anion Gap 7 mEQ/L     C-reactive protein [[id number]]  (Normal) Collected: 03/23/24 1409    Specimen: Blood, Venous Updated: 03/23/24 1438     CRP 1.60 mg/L     CBC and differential [[id number]]  (Abnormal) Collected: 03/23/24 1409    Specimen: Blood, Venous Updated: 03/23/24 1419     WBC 7.94 K/uL      RBC 4.69 M/uL      Hemoglobin 13.4 g/dL      Hematocrit 41.8 %      MCV 89.1 fL      MCH 28.6 pg      MCHC 32.1 g/dL      RDW 12.3 %      Platelets 327 K/uL      MPV 9.7 fL       Differential Type Auto     nRBC 0.0 %      Immature Granulocytes 0.3 %      Neutrophils 75.3 %      Lymphocytes 20.0 %      Monocytes 3.5 %      Eosinophils 0.3 %      Basophils 0.6 %      Immature Granulocytes, Absolute 0.02 K/uL      Neutrophils, Absolute 5.98 K/uL      Lymphocytes, Absolute 1.59 K/uL      Monocytes, Absolute 0.28 K/uL      Eosinophils, Absolute 0.02 K/uL      Basophils, Absolute 0.05 K/uL             Imaging Results              CT HEAD WITHOUT IV CONTRAST (Final result)  Result time 03/23/24 14:36:02      Final result                   Impression:    IMPRESSION:  No evidence of an acute posttraumatic intracranial injury.    COMMENT:    Comparison:  None.    TECHNIQUE: CT of the brain was performed and reconstructed/reformatted in the  axial, coronal and sagittal planes.  CT DOSE:  One or more dose reduction techniques (e.g. automated exposure  control, adjustment of the mA and/or kV according to patient size, use of  iterative reconstruction technique) utilized for this examination.    INTRAVENOUS CONTRAST: None    Brain parenchyma:  Gray-white matter differentiation is normal.  No evidence  of intracranial hemorrhage, midline shift or other mass effect.  No evidence of  an extra-axial fluid collection.  Ventricles and cisterns:  Normal in size and configuration.  Cranial carotid arteries: Limited assessment of the vasculature on this  nonenhanced study.  Calvarium and extra cranial soft tissues:  No evidence of a displaced  calvarial fracture.   Scalp soft tissue within normal limits.  Visualized paranasal sinuses and mastoid air cells:  Clear bilaterally.                 Narrative:    CLINICAL HISTORY:  Headache, chronic, new features or increased frequency                                       X-RAY CHEST 1 VIEW (Final result)  Result time 03/23/24 14:27:59      Final result                   Impression:    IMPRESSION: See comment.                 Narrative:    CLINICAL HISTORY:  "cp    COMPARISON:Chest x-ray 3/24/2024    COMMENT: Single view of the chest is obtained.    There is no pneumothorax, pleural effusion or focal consolidation. The  cardiopericardial silhouette is normal in size. Overlying soft tissues are  normal.                                      ECG 12 lead   Independent Interpretation by ED Provider   Rhythm: Sinus rhythm  Rate: 67 bpm  P waves: [normal interval]  QRS: [normal QRS]  Axis: [normal]  ST Segments: [no obvious ST elevation or ischemia]          Scoring tools                                  ED Course & MDM   MDM / ED COURSE / CLINICAL IMPRESSION / DISPO     Medical Decision Making  Patient is presents emergency department for evaluation for chest pain and paresthesias.  States that after she gave birth to her son in December she has had left-sided chest/breast pain and reports that in January she had an ultrasound and mammogram that just showed dense tissue.  She states that she did breast-feed for short period of time and denies any history of mastitis.  She reports persistent left-sided chest pain and she was seen and evaluated here yesterday for the same complaint and was also having left anterior neck paresthesias.  Patient had unremarkable EKG, blood work, chest x-ray and was told to follow-up with PCP.  Today she states that the paresthesias have traveled to her left forehead that she feels as though her left forehead/eyebrow feels \"heavy\".  She denies any eye pain, difficulty with eye movements headaches, dizziness, lightheadedness.  She reports that her chest pain is intermittent and worse with deep inspiration.  She also reports some intermittent back pain over the last 2 days but states that she has been lifting her child quite frequently.  Patient reports taking a dose of Plan B on Sunday as she and her 's condom broke.  Otherwise, she denies any use of hormonal medications.  Patient reports vaginal bleeding starting yesterday.  Reports that she " has only had 1 period postpartum that started on 3/6.  States that this feels ago regular period  Patient denies any current visual changes, recent falls or head injuries, recent Botox injection, difficulty speaking/walking/swallowing, or weakness or numbness in the extremities.  Denies any recent tick exposure or long periods of time spent outside.  She does report a recent increase in stress as a few days ago her mother-in-law suddenly passed away.    Vital Signs Review: Vital signs have been reviewed. The oxygen saturation is SpO2: 100 % which is normal.    Plan: Cardiac workup with dimer, CT head, tickborne labs, inflammatory markers, reevaluate    Patient's workup overall reassuring.  Cleared for PCP/neurology follow-up    Problems Addressed:  Facial paresthesia: acute illness or injury  Nonspecific chest pain: acute illness or injury    Amount and/or Complexity of Data Reviewed  Independent Historian: spouse  External Data Reviewed: labs and notes.  Labs: ordered. Decision-making details documented in ED Course.  Radiology: ordered. Decision-making details documented in ED Course.  ECG/medicine tests: ordered and independent interpretation performed. Decision-making details documented in ED Course.    Risk  OTC drugs.        ED Course as of 03/27/24 0602   Sat Mar 23, 2024   1507 CT HEAD WITHOUT IV CONTRAST    --  IMPRESSION:  No evidence of an acute posttraumatic intracranial injury.   [GS]   1507 WBC: 7.94 [GS]   1508 D-Dimer, Quant: <0.27 [GS]   1508 High Sens Troponin I: <2.0 [GS]   1508 CRP: 1.60 [GS]   1520 Discussed with ED attending, Dr. Wren, who is in agreement with plan. Will see/evaluate patient [GS]   1521 X-RAY CHEST 1 VIEW  There is no pneumothorax, pleural effusion or focal consolidation. The  cardiopericardial silhouette is normal in size. Overlying soft tissues are  normal. [LK]   1628 Workup reassuring.  Cleared for discharge [GS]   1646 Patient was reevaluated and updated on follow-up  plan.   [GS]   1650 Discharge paperwork reviewed with patient.  Follow-up instructions and return precautions given.  Patient verbalizes understanding and ambulated out of the emergency department with safe and steady gait. Accompanied by spouse [GS]   3138 ECG 12 lead  Rhythm: Sinus rhythm  Rate: 67 bpm  P waves: normal interval  QRS: normal QRS  Axis: normal  ST Segments: no obvious ST elevation or ischemia [LK]      ED Course User Index  [GS] Susana Brink PA C  [LK] Janett Wren MD     Clinical Impression      Nonspecific chest pain   Facial paresthesia     _________________       ED Disposition   Discharge                       Susana Brink PA C  03/26/24 1433       Susana Brink PA C  03/27/24 0602

## 2024-03-24 LAB
ATRIAL RATE: 67
P AXIS: 66
PARASITE BLD: NORMAL
PATH REV: NORMAL
PR INTERVAL: 136
QRS DURATION: 80
QT INTERVAL: 384
QTC CALCULATION(BAZETT): 405
R AXIS: 57
T WAVE AXIS: 47
VENTRICULAR RATE: 67

## 2024-03-25 ENCOUNTER — TELEPHONE (OUTPATIENT)
Dept: FAMILY MEDICINE | Facility: CLINIC | Age: 29
End: 2024-03-25
Payer: COMMERCIAL

## 2024-03-25 LAB — B BURGDOR AB SER IA-ACNC: 0.35 RATIO

## 2024-03-26 ENCOUNTER — OFFICE VISIT (OUTPATIENT)
Dept: FAMILY MEDICINE | Facility: CLINIC | Age: 29
End: 2024-03-26
Payer: COMMERCIAL

## 2024-03-26 VITALS
OXYGEN SATURATION: 97 % | SYSTOLIC BLOOD PRESSURE: 128 MMHG | BODY MASS INDEX: 29.35 KG/M2 | DIASTOLIC BLOOD PRESSURE: 70 MMHG | HEIGHT: 67 IN | WEIGHT: 187 LBS | TEMPERATURE: 97.9 F | HEART RATE: 99 BPM | RESPIRATION RATE: 18 BRPM

## 2024-03-26 DIAGNOSIS — D68.00 VON WILLEBRANDS DISEASE (CMS/HCC): ICD-10-CM

## 2024-03-26 DIAGNOSIS — R42 DIZZINESS: Primary | ICD-10-CM

## 2024-03-26 DIAGNOSIS — R07.89 LEFT-SIDED CHEST WALL PAIN: ICD-10-CM

## 2024-03-26 PROBLEM — O99.019 IRON DEFICIENCY ANEMIA OF PREGNANCY: Status: RESOLVED | Noted: 2023-11-29 | Resolved: 2024-03-26

## 2024-03-26 PROBLEM — D50.9 IRON DEFICIENCY ANEMIA OF PREGNANCY: Status: RESOLVED | Noted: 2023-11-29 | Resolved: 2024-03-26

## 2024-03-26 LAB — A PHAGOCYTOPH DNA BLD QL NAA+PROBE: NOT DETECTED

## 2024-03-26 PROCEDURE — 99215 OFFICE O/P EST HI 40 MIN: CPT | Performed by: NURSE PRACTITIONER

## 2024-03-26 PROCEDURE — 1111F DSCHRG MED/CURRENT MED MERGE: CPT | Performed by: NURSE PRACTITIONER

## 2024-03-26 PROCEDURE — 3008F BODY MASS INDEX DOCD: CPT | Performed by: NURSE PRACTITIONER

## 2024-03-26 RX ORDER — MECLIZINE HYDROCHLORIDE 25 MG/1
25 TABLET ORAL 3 TIMES DAILY PRN
Qty: 20 TABLET | Refills: 0 | Status: SHIPPED | OUTPATIENT
Start: 2024-03-26 | End: 2024-04-04

## 2024-03-26 ASSESSMENT — ENCOUNTER SYMPTOMS
DIAPHORESIS: 0
ABDOMINAL PAIN: 0
WEAKNESS: 0
VOMITING: 0
SEIZURES: 0
SPEECH DIFFICULTY: 0
DIFFICULTY URINATING: 0
FEVER: 0
FACIAL ASYMMETRY: 0
BLOOD IN STOOL: 0
DIARRHEA: 0
SINUS PRESSURE: 1
NAUSEA: 1
NERVOUS/ANXIOUS: 0
CHEST TIGHTNESS: 1
CONFUSION: 0
CONSTIPATION: 0
NUMBNESS: 1
FATIGUE: 0
NECK PAIN: 1
AGITATION: 0
DIZZINESS: 1
SLEEP DISTURBANCE: 0
CHILLS: 0
DYSPHORIC MOOD: 0
PALPITATIONS: 0

## 2024-03-26 ASSESSMENT — PAIN SCALES - GENERAL: PAINLEVEL: 0-NO PAIN

## 2024-03-26 NOTE — PROGRESS NOTES
"     [facility] IN [place]       Reason for visit: Hospital Discharge Follow-up    HPI:  Karen Sprague is a 28 y.o. female presenting for follow-up after hospital discharge.    Discharge Diagnosis: chest pain, anxiety  Discharging Facility: [place] [facility]  Date of Admission: March 22 / March 23  Date of Discharge: March 22 / March 23     Summary of Hospital Course:  March 22:  28-year-old female presents to the emergency department complaining of chest pain and numbness to the left side of her chest and neck.  She has been under a lot of stress recently.  She states she just had a baby in December.  She also states that she took Plan B over the weekend because the condom broke.  She also happened to mention that her mother-in-law passed away a couple of days ago.  They have been very stressed.  Her workup is reassuring.  Her PERC test was negative.  Troponin normal, normal EKG, normal chest x-ray.  I offered her anxiety medication however she states she is using herbal supplements to try to help with the anxiety.  I offered her outpatient resources but she would prefer to follow-up with her family doctor.  She denies suicidal ideation.  Advised to return for worsening symptoms.  Patient and  comfortable with plan.    March 23:  She reports persistent left-sided chest pain and she was seen and evaluated here yesterday for the same complaint and was also having left anterior neck paresthesias.  Patient had unremarkable EKG, blood work, chest x-ray and was told to follow-up with PCP.  Today she states that the paresthesias have traveled to her left forehead that she feels as though her left forehead/eyebrow feels \"heavy\".  She denies any eye pain, difficulty with eye movements headaches, dizziness, lightheadedness.  She reports that her chest pain is intermittent and worse with deep inspiration.  She also reports some intermittent back pain over the last 2 days but states that she has " "been lifting her child quite frequently.  Patient reports taking a dose of Plan B on Sunday as she and her 's condom broke.  Otherwise, she denies any use of hormonal medications.  Patient reports vaginal bleeding starting yesterday.  Reports that she has only had 1 period postpartum that started on 3/6.  States that this feels ago regular period  Patient denies any current visual changes, recent falls or head injuries, recent Botox injection, difficulty speaking/walking/swallowing, or weakness or numbness in the extremities.  Denies any recent tick exposure or long periods of time spent outside.  She does report a recent increase in stress as a few days ago her mother-in-law suddenly passed away.     CT HEAD WITHOUT IV CONTRAST     --  IMPRESSION:  No evidence of an acute posttraumatic intracranial injury.   [GS]   1507 WBC: 7.94 [GS]   1508 D-Dimer, Quant: <0.27 [GS]   1508 High Sens Troponin I: <2.0 [GS]   1508 CRP: 1.60 [GS]   1520 Discussed with ED attending, Dr. Wren, who is in agreement with plan. Will see/evaluate patient [GS]   1521 X-RAY CHEST 1 VIEW  There is no pneumothorax, pleural effusion or focal consolidation. The  cardiopericardial silhouette is normal in size. Overlying soft tissues are  normal. [LK]   1628 Workup reassuring.  Cleared for discharge [GS]   1646 Patient was reevaluated and updated on follow-up plan.   [GS]       Medications prescribed at discharge reconciled with current ambulatory medication list: Yes.    Inpatient testing (labs, imaging, cardiovascular) requiring follow-up: n/a     History since hospital discharge:     She stopped the ashwagnda supplement that she recently started for stress/anxiety as not sure if this triggered her sxs.  She has some lingering \"sinus pressure\" above L eye, minimal blurry vision, pressure in L ear.  Intermittent Lsided CP, nausea, and tingling down her back.  She feels this is vertigo.  Denies recent head injury or long distance travel.  " Denies prior diagnosis of mood d/o.  She is no longer breastfeeding.  She prefers to avoid Rx mood stabilizers for now.      Advance Care Planning Documents     Document Type Status Effective Date Expiration Date Received On Description    Advance Directives and Living Will Not Received        Power of  Not Received              Patient Active Problem List   Diagnosis   • Encounter for general adult medical examination without abnormal findings   • Skin lesion   • Diet controlled gestational diabetes mellitus (GDM) in third trimester   • Von Willebrands disease (CMS/HCC)   • Keratosis pilaris   • Left-sided chest wall pain   • Dizziness     Social Determinants of Health     Financial Resource Strain: Low Risk  (1/17/2020)    Overall Financial Resource Strain (CARDIA)    • Difficulty of Paying Living Expenses: Not hard at all   Food Insecurity: No Food Insecurity (3/23/2024)    Hunger Vital Sign    • Worried About Running Out of Food in the Last Year: Never true    • Ran Out of Food in the Last Year: Never true   Transportation Needs: No Transportation Needs (1/17/2020)    PRAPARE - Transportation    • Lack of Transportation (Medical): No    • Lack of Transportation (Non-Medical): No   Physical Activity: Not on file   Stress: Not on file   Social Connections: Not on file   Housing Stability: Not on file   Utilities: Not on file     Allergies  Patient has no known allergies.    Current Outpatient Medications   Medication Sig Dispense Refill   • meclizine (ANTIVERT) 25 mg tablet Take 1 tablet (25 mg total) by mouth 3 (three) times a day as needed for dizziness. 20 tablet 0     No current facility-administered medications for this visit.       ROS:  Review of Systems   Constitutional: Negative for chills, diaphoresis, fatigue and fever.   HENT: Positive for ear pain and sinus pressure (around L eye). Negative for sneezing.    Eyes: Positive for visual disturbance (intermittent).   Respiratory: Positive for chest  "tightness.    Cardiovascular: Positive for chest pain (Lsided, worse with heavy lifting). Negative for palpitations and leg swelling.   Gastrointestinal: Positive for nausea. Negative for abdominal pain, blood in stool, constipation, diarrhea and vomiting.   Genitourinary: Negative for difficulty urinating.   Musculoskeletal: Positive for neck pain.   Neurological: Positive for dizziness and numbness. Negative for seizures, syncope, facial asymmetry, speech difficulty and weakness.   Psychiatric/Behavioral: Negative for agitation, confusion, dysphoric mood and sleep disturbance. The patient is not nervous/anxious.    All other systems reviewed and are negative.      Vitals:    03/26/24 1036   BP: 128/70   BP Location: Left upper arm   Patient Position: Sitting   Pulse: 99   Resp: 18   Temp: 36.6 °C (97.9 °F)   TempSrc: Tympanic   SpO2: 97%   Weight: 84.8 kg (187 lb)   Height: 1.702 m (5' 7\")       EXAM:  Physical Exam  Vitals reviewed.   Constitutional:       Appearance: Normal appearance.   HENT:      Head: Normocephalic and atraumatic.      Right Ear: A middle ear effusion is present. Tympanic membrane is not erythematous.      Left Ear: A middle ear effusion is present. Tympanic membrane is not erythematous.      Nose: Nose normal.      Mouth/Throat:      Mouth: Mucous membranes are moist.      Pharynx: Oropharynx is clear.   Eyes:      Extraocular Movements: Extraocular movements intact.      Pupils: Pupils are equal, round, and reactive to light.   Cardiovascular:      Rate and Rhythm: Normal rate and regular rhythm.      Heart sounds: Normal heart sounds. No murmur heard.  Pulmonary:      Effort: Pulmonary effort is normal.      Breath sounds: Normal breath sounds.   Musculoskeletal:      Cervical back: Normal range of motion and neck supple.   Lymphadenopathy:      Cervical: No cervical adenopathy.   Skin:     General: Skin is warm and dry.   Neurological:      General: No focal deficit present.      Mental " Status: She is alert and oriented to person, place, and time.      Sensory: No sensory deficit.      Motor: No weakness.      Coordination: Coordination normal.      Gait: Gait normal.   Psychiatric:         Mood and Affect: Mood normal.         Behavior: Behavior normal.         Thought Content: Thought content normal.         Judgment: Judgment normal.         Lab Results   Component Value Date    WBC 7.94 03/23/2024    HGB 13.4 03/23/2024    HCT 41.8 03/23/2024     03/23/2024    ALT 25 03/23/2024    AST 21 03/23/2024     03/23/2024    K 4.0 03/23/2024     03/23/2024    CREATININE 0.9 03/23/2024    BUN 11 03/23/2024    CO2 26 03/23/2024         ASSESSMENT/PLAN:  Diagnoses and all orders for this visit:    Dizziness (Primary)  Assessment & Plan:  Benign exam and VSS.  Her negative head CT recently is reassuring and we reviewed this.      Vertigo vs Stress reaction.  She prefers to avoid mood stabilizer at this time.  Willing to try flonase, claritin, benadryl for now in case of allergic sinusitis.  Pocket Rx given for meclizine.  S/E reviewed.  Sent home with instructions on epley maneuvers.  She will f/u if not improving, as she may benefit from vestibular therapy.  Strict ED return precautions.      Left-sided chest wall pain  Assessment & Plan:  Both recent cardiac workups were negative.  She carries her son often with LUE, so suspect more of a M/S etiology.  We discussed trying nsaids, heating pad, chiropractor, massage, etc.  She is agreeable to this plan.  Again, reinforced ED return precautions.      Von Willebrands disease (CMS/HCC)  Assessment & Plan:  Asymptomatic.  Previously seen by hem/onc.      Other orders  -     meclizine (ANTIVERT) 25 mg tablet; Take 1 tablet (25 mg total) by mouth 3 (three) times a day as needed for dizziness.          JULIETA Cruz  3/26/2024     I spent  40 minutes on this date of service performing the following activities: obtaining history,  performing examination, entering orders, documenting, preparing for visit, obtaining / reviewing records, providing counseling and education, communicating results and coordinating care.

## 2024-03-26 NOTE — ASSESSMENT & PLAN NOTE
Benign exam and VSS.  Her negative head CT recently is reassuring and we reviewed this.      Vertigo vs Stress reaction.  She prefers to avoid mood stabilizer at this time.  Willing to try flonase, claritin, benadryl for now in case of allergic sinusitis.  Pocket Rx given for meclizine.  S/E reviewed.  Sent home with instructions on epley maneuvers.  She will f/u if not improving, as she may benefit from vestibular therapy.  Strict ED return precautions.

## 2024-03-26 NOTE — ASSESSMENT & PLAN NOTE
Both recent cardiac workups were negative.  She carries her son often with LUE, so suspect more of a M/S etiology.  We discussed trying nsaids, heating pad, chiropractor, massage, etc.  She is agreeable to this plan.  Again, reinforced ED return precautions.

## 2024-04-03 ENCOUNTER — TELEPHONE (OUTPATIENT)
Dept: FAMILY MEDICINE | Facility: CLINIC | Age: 29
End: 2024-04-03
Payer: COMMERCIAL

## 2024-04-03 NOTE — TELEPHONE ENCOUNTER
[facility] Appointment Request   Provider: Klever  Appointment Type: SDS  Reason for Visit: Wants to See PCP-Ear ache for 2 weeks, since the weather change, last night it worsened   Available Day and Time: Tomorrow- morning   Best Contact Number: [phone]     The practice will reach out to schedule your appointment within the next 2 business days.

## 2024-04-04 ENCOUNTER — OFFICE VISIT (OUTPATIENT)
Dept: FAMILY MEDICINE | Facility: CLINIC | Age: 29
End: 2024-04-04
Payer: COMMERCIAL

## 2024-04-04 VITALS
SYSTOLIC BLOOD PRESSURE: 120 MMHG | HEART RATE: 75 BPM | DIASTOLIC BLOOD PRESSURE: 72 MMHG | WEIGHT: 191.6 LBS | OXYGEN SATURATION: 98 % | BODY MASS INDEX: 30.07 KG/M2 | HEIGHT: 67 IN | TEMPERATURE: 97 F | RESPIRATION RATE: 16 BRPM

## 2024-04-04 DIAGNOSIS — H65.193 ACUTE MEE (MIDDLE EAR EFFUSION), BILATERAL: Primary | ICD-10-CM

## 2024-04-04 DIAGNOSIS — R22.1 LOCALIZED SWELLING, MASS AND LUMP, NECK: ICD-10-CM

## 2024-04-04 PROBLEM — R07.89 LEFT-SIDED CHEST WALL PAIN: Status: RESOLVED | Noted: 2024-03-26 | Resolved: 2024-04-04

## 2024-04-04 PROBLEM — O24.410 DIET CONTROLLED GESTATIONAL DIABETES MELLITUS (GDM) IN THIRD TRIMESTER: Status: RESOLVED | Noted: 2023-11-29 | Resolved: 2024-04-04

## 2024-04-04 PROBLEM — R42 DIZZINESS: Status: RESOLVED | Noted: 2024-03-26 | Resolved: 2024-04-04

## 2024-04-04 PROCEDURE — 3008F BODY MASS INDEX DOCD: CPT | Performed by: NURSE PRACTITIONER

## 2024-04-04 PROCEDURE — 99213 OFFICE O/P EST LOW 20 MIN: CPT | Performed by: NURSE PRACTITIONER

## 2024-04-04 RX ORDER — AZELASTINE 1 MG/ML
1 SPRAY, METERED NASAL 2 TIMES DAILY
Qty: 30 ML | Refills: 3 | Status: SHIPPED | OUTPATIENT
Start: 2024-04-04

## 2024-04-04 ASSESSMENT — ENCOUNTER SYMPTOMS
EYE DISCHARGE: 0
HEADACHES: 0
FATIGUE: 0
DIZZINESS: 0
CHILLS: 0
TROUBLE SWALLOWING: 0
FEVER: 0
VOICE CHANGE: 0
EYE PAIN: 0
FACIAL SWELLING: 0
EYE REDNESS: 0
SINUS PRESSURE: 0
NECK PAIN: 1
SORE THROAT: 0
EYE ITCHING: 0

## 2024-04-04 NOTE — PATIENT INSTRUCTIONS
Start the Rx nasal spray (twice a day)  OK to continue benadryl @ bedtime  Start either claritin or allegra daily (morning)

## 2024-04-04 NOTE — PROGRESS NOTES
"   [facility]  [address] [place] [address]  [phone]          Karen Sprague is a 28 y.o. female who presents with EAR PAIN  Chief Complaint   Patient presents with    office visit      Left Ear pressure and left head pressure x 2 weeks         # ear problem    Seen by me last week for ED f/u for dizziness.  CT head negative.  Suspected vertigo vs anxiety.  Recommended allergy medication and gave pocket rx for meclizine.  Was also asked to try epley maneuvers at home.    Returns today stating her L ear is still muffled and \"heavy.\"  No ear pain or dc.  No f/c.  Dizziness has been improving so she never tried the meclizine.  She did take benadryl and ibuprofen with some relief.  Still getting intermittent pain behind her L ear radiating down Lside neck to L chest as she did before.      Medical History:  Past Medical History:   Diagnosis Date    Abnormal Pap smear of cervix     Blood disorder     Gestational diabetes     Obesity     Von Willebrands disease (CMS/[facility])        Surgical History:  Past Surgical History:   Procedure Laterality Date    FOOT SURGERY      ingrown toenails    TONSILLECTOMY      WISDOM TOOTH EXTRACTION         Social History:  Social History     Social History Narrative    Not on file       Family History:  Family History   Problem Relation Age of Onset    Stroke Maternal Grandmother     Diabetes Maternal Grandfather     Cancer Maternal Grandfather     Cancer Biological Mother     Hypertension Biological Mother        Allergies:  Patient has no known allergies.    Current Medications:  Current Outpatient Medications   Medication Sig Dispense Refill    azelastine (ASTELIN) 137 mcg (0.1 %) nasal spray Administer 1 spray into each nostril 2 (two) times a day. 30 mL 3     No current facility-administered medications for this visit.       Review of Systems:  Review of Systems   Constitutional:  Negative for chills, fatigue and fever.   HENT:  Positive for hearing " "loss. Negative for congestion, ear discharge, ear pain, facial swelling, sinus pressure, sneezing, sore throat, tinnitus, trouble swallowing and voice change.    Eyes:  Negative for pain, discharge, redness and itching.   Musculoskeletal:  Positive for neck pain (left sided soft tissue).   Skin:  Negative for rash.   Neurological:  Negative for dizziness and headaches.       Objective     Vital Signs:  Vitals:    04/04/24 0958   BP: 120/72   BP Location: Right upper arm   Patient Position: Sitting   Pulse: 75   Resp: 16   Temp: 36.1 °C (97 °F)   TempSrc: Temporal   SpO2: 98%   Weight: 86.9 kg (191 lb 9.6 oz)   Height: 1.702 m (5' 7\")       BMI:  Body mass index is 30.01 kg/m².     Physical Exam  Vitals reviewed.   Constitutional:       Appearance: Normal appearance. She is not ill-appearing.   HENT:      Head: Normocephalic and atraumatic.      Right Ear: A middle ear effusion is present.      Left Ear: A middle ear effusion is present.      Nose: Nose normal.      Mouth/Throat:      Mouth: Mucous membranes are moist.      Pharynx: Oropharynx is clear.   Eyes:      Extraocular Movements: Extraocular movements intact.   Neck:      Thyroid: No thyroid mass, thyromegaly or thyroid tenderness.      Trachea: Trachea and phonation normal.     Cardiovascular:      Rate and Rhythm: Normal rate and regular rhythm.      Heart sounds: Normal heart sounds. No murmur heard.  Pulmonary:      Effort: Pulmonary effort is normal.      Breath sounds: Normal breath sounds.   Musculoskeletal:      Cervical back: Full passive range of motion without pain and neck supple. Edema present. No erythema or signs of trauma. No pain with movement or muscular tenderness. Normal range of motion.   Lymphadenopathy:      Cervical: No cervical adenopathy.   Skin:     General: Skin is warm and dry.      Findings: No rash.   Neurological:      General: No focal deficit present.      Mental Status: She is alert and oriented to person, place, and time. "   Psychiatric:         Mood and Affect: Mood normal.         Behavior: Behavior normal.          Procedures   Assessment     Patient Instructions   Start the Rx nasal spray (twice a day)  OK to continue benadryl @ bedtime  Start either claritin or allegra daily (morning)  Problem List Items Addressed This Visit          Infectious/Inflammatory    Acute GUILLERMINA (middle ear effusion), bilateral - Primary     Continue benadryl, add in claritin and astelin regularly.  F/U with me or ENT if not improving.         Relevant Medications    azelastine (ASTELIN) 137 mcg (0.1 %) nasal spray       Other    Localized swelling, mass and lump, neck     She is agreeable to getting US.  I encouraged her to get the labs done that were ordered at her Nov well visit also.  Will refer to specialist if needed.         Relevant Orders    ULTRASOUND NECK          The total time spent ON THE DAY OF THE VISIT was 20 minutes, including preparing to see the patient, obtaining and reviewing separately obtained history, performing medically appropriate examination or evaluation, counseling and educating patient/family/caregiver, ordering medications, tests or procedures, referring and communicating with other health care professionals, documenting clinical information in electronic or other record, independently interpreting and communicating results to patient/family/caregiver, and/or care coordination.           JULIETA Cruz  4/4/2024

## 2024-04-04 NOTE — ASSESSMENT & PLAN NOTE
She is agreeable to getting US.  I encouraged her to get the labs done that were ordered at her Nov well visit also.  Will refer to specialist if needed.

## 2024-04-05 ENCOUNTER — HOSPITAL ENCOUNTER (OUTPATIENT)
Dept: RADIOLOGY | Age: 29
Discharge: HOME | End: 2024-04-05
Attending: NURSE PRACTITIONER
Payer: COMMERCIAL

## 2024-04-05 DIAGNOSIS — R22.1 LOCALIZED SWELLING, MASS AND LUMP, NECK: ICD-10-CM

## 2024-04-05 PROCEDURE — 76536 US EXAM OF HEAD AND NECK: CPT

## 2024-05-13 ENCOUNTER — TELEPHONE (OUTPATIENT)
Dept: FAMILY MEDICINE | Facility: CLINIC | Age: 29
End: 2024-05-13
Payer: COMMERCIAL

## 2024-05-13 NOTE — TELEPHONE ENCOUNTER
No care due was identified.  [facility] Embedded Care Due Messages. Reference number: [id number].   12/21/2023 5:16:26 AM CST   Patient is requesting info on a dermatologist that Dr. Ernst recommend to her, but she can not remember the information. Please give her a call to discuss.

## 2024-05-16 ENCOUNTER — APPOINTMENT (RX ONLY)
Dept: URBAN - METROPOLITAN AREA CLINIC 374 | Facility: CLINIC | Age: 29
Setting detail: DERMATOLOGY
End: 2024-05-16

## 2024-05-16 DIAGNOSIS — Z71.89 OTHER SPECIFIED COUNSELING: ICD-10-CM

## 2024-05-16 DIAGNOSIS — L81.4 OTHER MELANIN HYPERPIGMENTATION: ICD-10-CM

## 2024-05-16 DIAGNOSIS — D22 MELANOCYTIC NEVI: ICD-10-CM

## 2024-05-16 DIAGNOSIS — L91.8 OTHER HYPERTROPHIC DISORDERS OF THE SKIN: ICD-10-CM

## 2024-05-16 DIAGNOSIS — L82.1 OTHER SEBORRHEIC KERATOSIS: ICD-10-CM

## 2024-05-16 DIAGNOSIS — D18.0 HEMANGIOMA: ICD-10-CM

## 2024-05-16 PROBLEM — D18.01 HEMANGIOMA OF SKIN AND SUBCUTANEOUS TISSUE: Status: ACTIVE | Noted: 2024-05-16

## 2024-05-16 PROBLEM — D22.5 MELANOCYTIC NEVI OF TRUNK: Status: ACTIVE | Noted: 2024-05-16

## 2024-05-16 PROCEDURE — ? TREATMENT REGIMEN

## 2024-05-16 PROCEDURE — ? COUNSELING

## 2024-05-16 PROCEDURE — 99203 OFFICE O/P NEW LOW 30 MIN: CPT

## 2024-05-16 PROCEDURE — ? FULL BODY SKIN EXAM

## 2024-05-16 PROCEDURE — ? COSMETIC QUOTE

## 2024-05-16 ASSESSMENT — LOCATION SIMPLE DESCRIPTION DERM
LOCATION SIMPLE: LEFT CHEEK
LOCATION SIMPLE: UPPER BACK
LOCATION SIMPLE: RIGHT FOREHEAD
LOCATION SIMPLE: RIGHT UPPER BACK
LOCATION SIMPLE: CHEST
LOCATION SIMPLE: RIGHT SHOULDER
LOCATION SIMPLE: ABDOMEN

## 2024-05-16 ASSESSMENT — LOCATION DETAILED DESCRIPTION DERM
LOCATION DETAILED: UPPER STERNUM
LOCATION DETAILED: RIGHT INFERIOR MEDIAL UPPER BACK
LOCATION DETAILED: INFERIOR THORACIC SPINE
LOCATION DETAILED: RIGHT MEDIAL FOREHEAD
LOCATION DETAILED: RIGHT ANTERIOR SHOULDER
LOCATION DETAILED: LEFT CENTRAL MALAR CHEEK
LOCATION DETAILED: EPIGASTRIC SKIN

## 2024-05-16 ASSESSMENT — LOCATION ZONE DERM
LOCATION ZONE: FACE
LOCATION ZONE: ARM
LOCATION ZONE: TRUNK

## 2024-05-16 NOTE — PROCEDURE: COSMETIC QUOTE
Voluma Price Per Syringe: 500
Xeomin Price Per Unit: 15
Detail Level: Simple
Discount Percentage: 0
Misc Procedure Description: Skin tag removal
Notice: We have created a more complete Cosmetic Quote plan.  The procedure name is also Cosmetic Quote.  Please review the new plan and hide the Cosmetic Quote plan you do not want to use.

## 2024-10-02 ENCOUNTER — TRANSCRIBE ORDERS (OUTPATIENT)
Dept: SCHEDULING | Age: 29
End: 2024-10-02

## 2024-10-02 DIAGNOSIS — N64.52 NIPPLE DISCHARGE: Primary | ICD-10-CM

## 2024-10-04 ENCOUNTER — HOSPITAL ENCOUNTER (OUTPATIENT)
Dept: RADIOLOGY | Age: 29
Discharge: HOME | End: 2024-10-04
Attending: OBSTETRICS & GYNECOLOGY
Payer: COMMERCIAL

## 2024-10-04 DIAGNOSIS — N64.52 NIPPLE DISCHARGE: ICD-10-CM

## 2024-10-04 PROCEDURE — 76642 ULTRASOUND BREAST LIMITED: CPT | Mod: LT

## 2024-10-04 PROCEDURE — G0279 TOMOSYNTHESIS, MAMMO: HCPCS | Mod: LT
